# Patient Record
Sex: MALE | Race: BLACK OR AFRICAN AMERICAN | NOT HISPANIC OR LATINO
[De-identification: names, ages, dates, MRNs, and addresses within clinical notes are randomized per-mention and may not be internally consistent; named-entity substitution may affect disease eponyms.]

---

## 2017-06-16 PROBLEM — K85.20 ALCOHOL-INDUCED ACUTE PANCREATITIS: Status: ACTIVE | Noted: 2017-06-16

## 2017-06-16 PROBLEM — R03.0 ELEVATED BLOOD PRESSURE READING: Status: ACTIVE | Noted: 2017-06-16

## 2017-06-16 PROBLEM — K85.20 ALCOHOL-INDUCED ACUTE PANCREATITIS, UNSPECIFIED COMPLICATION STATUS: Status: ACTIVE | Noted: 2017-06-16

## 2017-06-16 NOTE — ED PROVIDER NOTES
[facility] in: [facility] In [place]    History   Patient presents with:  Abdomen/Flank Pain (GI/)    Stated Complaint: LUQ pain/nausea x 2 days. pain radiates to chest and back.  was seen at [facility]*    HPI    CHIEF COMPLAINT: Na pain  Skin: no rashes  Neurological: no headache     Otherwise a complete review of systems was obtained and other than the HPI was negative     The patient's medication list, past medical history and social history elements is as listed in today's nurse's kg  BMI 21.29 kg/m2  SpO2 96%        Physical Exam    Vital signs and nursing notes reviewed  General Appearance: Patient is alert and oriented x4 in no acute distress  Eyes: pupils equal and round, reactive to light.  No pallor or injection, no sclera icte DIFFERENTIAL WITH PLATELET    Narrative: The following orders were created for panel order CBC WITH DIFFERENTIAL WITH PLATELET.   Procedure                               Abnormality         Status                     ---------

## 2017-06-16 NOTE — H&P
[facility] HOSPITALIST  History and Physical     Rosie Wayne [facility] Patient Status:  Emergency    1980 MRN [id number]   Location [address] [place] [address] Attending Dipti Avery MD   Hosp Day # 0 PCP No primary care provider 21.29 kg/m2  SpO2 98%  General: No acute distress. Alert and oriented x 3. Mild tremors  HEENT: Normocephalic atraumatic. Moist mucous membranes. EOM-I. PERRLA. Anicteric. Neck: No lymphadenopathy. No JVD. No carotid bruits.   Respiratory: Clear to auscult

## 2017-06-16 NOTE — ED NOTES
Report given to Robby Dailey [address] [facility]. Wife will drive patient by private car, instructed to go directly to [facility], states understanding.

## 2017-06-16 NOTE — ED PROVIDER NOTES
Patient Seen in: [facility] In [place]    History   Patient presents with:  Abdomen/Flank Pain (GI/)    Stated Complaint: LUQ pain/nausea x 2 days. pain radiates to chest and back.  was seen at [facility]*    HPI        Past Medical Hi Neutrophil Absolute Prelim 7.10 (*)     Neutrophil Absolute 7.10 (*)     Monocyte Absolute 0.88 (*)     All other components within normal limits   CBC WITH DIFFERENTIAL WITH PLATELET    Narrative:      The following orders were created for panel order CBC today.  The patient was provided with opiate analgesics for pain in the emergency department. Suspect a gastritis or pancreatitis. Will consider CT imaging based on laboratory reports. Otherwise agree with the treatment plan.

## 2017-06-16 NOTE — ED INITIAL ASSESSMENT (HPI)
C/o LUQ pain and nausea since yesterday-pain radiates to chest and back started today. Was seen at a [de-identified[facility] Immediate Care , labs were drawn and chest xray-taken.

## 2017-06-17 NOTE — PLAN OF CARE
NURSING ADMISSION NOTE      Patient admitted via Wheelchair. Wife drove him from [place] [facility]. Oriented to room. Safety precautions initiated. Bed in low position. Call light in reach. Admission interview completed. Report given to floor RN.

## 2017-06-17 NOTE — PROGRESS NOTES
[facility] HOSPITALIST  Progress Note     [facility] Patient Status:  Inpatient    1980 MRN [id number]   [facility] 3NE-A Attending Emanuel Rodriguez MD   Hosp Day # 1 PCP None Pcp     Chief Complaint: abd pain    S: Patient with kiah abuse/dependence with risk for withdrawal-[facility] protocol, vitamins; showing mild signs of withdrawal now   I believe patient is under reporting  3. Thrombocytopenia due to etoh use  4. transaminitis-Probable mild alcoholic hepatitis  5.  Tobacco use/dependen

## 2017-06-18 NOTE — PLAN OF CARE
ANXIETY    • Will report anxiety at manageable levels Progressing        CARDIOVASCULAR - ADULT    • Maintains optimal cardiac output and hemodynamic stability Progressing    • Absence of cardiac arrhythmias or at baseline Progressing        GASTROINTESTIN

## 2017-06-18 NOTE — DISCHARGE SUMMARY
[facility] HOSPITAL[facility]  DISCHARGE SUMMARY     Cipriano [facility] Patient Status:  Inpatient    1980 MRN [id number]   [facility] 3NE-A Attending Micheal Alarcon MD   [facility] Day # 2 PCP None Pcp     Date of Admission: 2017  Date of [address] Incidental or significant findings and recommendations (brief descriptions):  • See above    Lab/Test results pending at Discharge:   · none    Consultants:  • none    Antibiotic: none   End date: none    Discharge Medication List:     Discharge Me rhonchi. Cardiovascular: S1, S2. Regular rate and rhythm. No murmurs, rubs or gallops. Abdomen: Soft, nontender, nondistended. Positive bowel sounds. No rebound or guarding. Neurologic: No focal neurological deficits.    Musculoskeletal: Moves all extr

## 2017-06-18 NOTE — PROGRESS NOTES
NURSING DISCHARGE NOTE    Discharged Home via Ambulatory. Accompanied by Spouse  Belongings Taken by patient/family.

## 2017-06-18 NOTE — PROGRESS NOTES
[facility] HOSPITALIST  Progress Note     [facility] Patient Status:  Inpatient    1980 MRN [id number]   [facility] 3NE-A Attending Dewey Camacho MD   [facility] Day # 2 PCP None Pcp     Chief Complaint: abd pain    S: Patient doing we pancreatitis-fluids, cld, US with evidence of gall stone but no blockages  2. Alcohol abuse/dependence with risk for withdrawal- patient states he will follow up with resources given to him by his PCP  3. Thrombocytopenia due to etoh use  4.  transaminitis-

## 2017-06-19 NOTE — PROGRESS NOTES
[facility] [address]  [place], [address]  ?  06/19/2017  ? Re: Robert Montano  ? To Whom It May Concern:    Robert Montano was admitted to [facility] from 6/16/2017 to 06/18/2017.     Please excuse Wolfgang Prieto

## 2017-06-19 NOTE — PAYOR COMM NOTE
ADMISSION REVIEW     Payor: [address]  Subscriber #:  [id number]  Authorization Number: N/A    Admit date: 6/16/2017  2:30 PM       Admitting Physician: Sebastian Burrows MD    REVIEW DOCUMENTATION:  Patient Seen in: [facility] Emergency Departme None (Room air)       Current:/96 mmHg  Pulse 104  Temp(Src) 98.6 °F (37 °C)  Resp 21  Ht 172.7 cm (5' 8\")  Wt 63.504 kg  BMI 21.29 kg/m2  SpO2 96%    Physical Exam  Gastrointestinal:  abdomen is soft; mild tenderness to palpation of right and left dilatation. Sonographer states negative sonographic Nogueira's sign. 2. Pancreas and portions of the abdominal aorta are obscured limiting evaluation.     Multiple doses IV pain meds needed for pain control

## 2017-09-10 NOTE — BH LEVEL OF CARE ASSESSMENT
Level of Care Assessment Note                General Questions  Why are you here?: \"I hadn't eaten for awile so I told my wife to bring me in because I wasn't feeling well. \"    Precipitating Events: Pt reported headaches that cause nausea and they cause Steady  Speech Characteristics: Normal rate;Normal rhythm;Normal volume  Concentration: Unimpaired  Memory: Recent memory intact (Did not assess remote memory)  Orientation Level: Oriented X4  Thought Characteristics: Alert; Coherent;Guarded;Logical;Organiz

## 2017-09-10 NOTE — ED INITIAL ASSESSMENT (HPI)
Pt presents to ER with \"not feeling well. \" pt has not been eating and only drinking alcohol. Pt has a hx of pancreatitis and liver cirrosis. No vomiting, no diarrhea. Last drink was this morning.  Pt states that he is interested in detox and wants to stop

## 2017-09-10 NOTE — ED PROVIDER NOTES
Patient Seen in: [facility] Emergency Department    History   Patient presents with:  Abdomen/Flank Pain (GI/)  Poor Feed Anorexia (constitutional)    Stated Complaint: poor intake, abd pain    HPI    Patient is a 26-year-old male who is here requ 165/115  Pulse: 104  Resp: 18  Temp: 98 °F (36.7 °C)  Temp src: Temporal  SpO2: 99 %  O2 Device: None (Room air)    Current:/98   Pulse 112   Temp 98 °F (36.7 °C) (Temporal)   Resp 18   Ht 172.7 cm (5' 8\")   Wt 59 kg   SpO2 100%   BMI 19.77 kg/m² therapeutic range has been validated against 0.3-0.7 heparin anti-Xa units/mL. MAGNESIUM - Normal   CBC WITH DIFFERENTIAL WITH PLATELET    Narrative: The following orders were created for panel order CBC WITH DIFFERENTIAL WITH PLATELET.   Procedure

## 2017-11-30 ENCOUNTER — CHARTING TRANS (OUTPATIENT)
Dept: OTHER | Age: 37
End: 2017-11-30

## 2017-11-30 NOTE — ED NOTES
Pt belongings placed smartsafe bag [id number], belonging include pants, shirt, shoes, boxers, phone, keys and wallet. Belongings placed at nurses station bin.

## 2017-11-30 NOTE — ED NOTES
Pt's wife called nursing station and inquired Omer Beard is going on with pt\". When asking pt's permission to give wife information pt does not want nurse telling wife anything and that wife should call pt's cell.   Wife notified and will call pt's cell phon

## 2017-11-30 NOTE — ED NOTES
Pt c/o pain at IV site. Fluids are completed. He is asking to take IV site out as it is hurting him. No redness, no swelling noted. IV site Dc'd. Pt is asking for clothes. Instructed pt again why clothes can't be given.   He is asking why he can't cora

## 2017-11-30 NOTE — ED NOTES
Per Dr. May Dalton, pt may use his cell phone to call for ride and he may leave when ride comes to drive him home. Pt unable to get a hold of wife yet. Pt asking for belongings before his wife gets here.   Pt instructed that belongings would be given when

## 2017-11-30 NOTE — ED PROVIDER NOTES
Patient Seen in: [facility] Emergency Department    History   Patient presents with:  Chest Pain Angina (cardiovascular)    Stated Complaint: chest pain    HPI    Patient presents to the emergency department after drinking alcohol this morning he fe discharge. Left eye exhibits no discharge. Scleral icterus is present. Neck: Normal range of motion. Neck supple. No JVD present. Cardiovascular: Regular rhythm, normal heart sounds and intact distal pulses. Exam reveals no gallop and no friction rub. Abnormal            Final result                 Please view results for these tests on the individual orders.    RAINBOW DRAW BLUE   RAINBOW DRAW LAVENDER   RAINBOW DRAW LIGHT GREEN   RAINBOW DRAW GOLD   XR CHEST AP PORTABLE  (CPT=71010)   Final Result diagnosis)  Palpitations    Disposition:  Discharge  11/30/2017  5:00 pm    Follow-up:  Miller Hennessy  [address] [facility]  [phone]    Schedule an appointment as soon as possible for a visit          Medications Pr

## 2017-11-30 NOTE — ED INITIAL ASSESSMENT (HPI)
Pt was at home today and felt chest pain with feelings of heart racing. H/o etoh abuse and appears grossly intoxicated. Denies sob. Some nausea. Pt is slow to respond verbally.

## 2017-11-30 NOTE — ED NOTES
Nurse spent much time with pt as he has lots of questions regarding labs, vital signs,. Pt ambulated to bathroom. Gait is steady. Pt speaks slowly when he talks, pausing between short phrases. No aggressive behavior.   Dr. Deepa Winkler notified of elevated

## 2017-12-01 ENCOUNTER — CHARTING TRANS (OUTPATIENT)
Dept: OTHER | Age: 37
End: 2017-12-01

## 2017-12-01 ENCOUNTER — LAB SERVICES (OUTPATIENT)
Dept: OTHER | Age: 37
End: 2017-12-01

## 2017-12-01 NOTE — ED NOTES
Pt states his wife will not come and get him. He has no other person to call to come and get him. Pt is alert and oriented. He is steady on his feet and conversing normally with staff.   Dr. Brenna Anthony notified and will speak to pt regarding discharge optio

## 2017-12-01 NOTE — ED NOTES
Pt's car keys with security and may  at 2300 tonight or after. Pt aware. Escorted to cab by security. Pt asked if he could get something out of his car with security. Security states pt would not be able to do this.   Assisted to cab and discharg

## 2017-12-01 NOTE — ED NOTES
Call placed to [employer] at [phone].   Cab will be approx 20 minutes to arrival.  Pt's car keys will be left with security

## 2017-12-01 NOTE — ED NOTES
Pt informed by Dr. Tang Casas he could take a taxi home but would have to leave car with Security. Pt agreed to this. Pt car key placed in [facility] [id number] and given to security.

## 2017-12-04 ENCOUNTER — CHARTING TRANS (OUTPATIENT)
Dept: OTHER | Age: 37
End: 2017-12-04

## 2017-12-04 LAB
ALBUMIN SERPL-MCNC: 4.7 G/DL (ref 3.6–5.1)
ALBUMIN/GLOB SERPL: 1.6 (ref 1–2.4)
ALP SERPL-CCNC: 86 UNITS/L (ref 45–117)
ALT SERPL-CCNC: 108 UNITS/L
ANION GAP SERPL CALC-SCNC: 18 MMOL/L (ref 10–20)
AST SERPL-CCNC: 162 UNITS/L
BASOPHILS # BLD: 0 K/MCL (ref 0–0.3)
BASOPHILS NFR BLD: 1 %
BILIRUB SERPL-MCNC: 1.1 MG/DL (ref 0.2–1)
BUN SERPL-MCNC: 3 MG/DL (ref 6–20)
BUN/CREAT SERPL: 6 (ref 7–25)
CALCIUM SERPL-MCNC: 9.3 MG/DL (ref 8.4–10.2)
CHLORIDE SERPL-SCNC: 107 MMOL/L (ref 98–107)
CHOLEST SERPL-MCNC: 224 MG/DL
CHOLEST/HDLC SERPL: 2.2
CO2 SERPL-SCNC: 22 MMOL/L (ref 21–32)
CREAT SERPL-MCNC: 0.52 MG/DL (ref 0.67–1.17)
DIFFERENTIAL METHOD BLD: ABNORMAL
EOSINOPHIL # BLD: 0 K/MCL (ref 0.1–0.5)
EOSINOPHIL NFR BLD: 0 %
ERYTHROCYTE [DISTWIDTH] IN BLOOD: 13.7 % (ref 11–15)
GLOBULIN SER-MCNC: 2.9 G/DL (ref 2–4)
GLUCOSE SERPL-MCNC: 67 MG/DL (ref 65–99)
HDLC SERPL-MCNC: 100 MG/DL
HEMATOCRIT: 47.2 % (ref 39–51)
HEMOGLOBIN: 15.8 G/DL (ref 13–17)
LDLC SERPL CALC-MCNC: 96 MG/DL
LENGTH OF FAST TIME PATIENT: 12 HRS
LENGTH OF FAST TIME PATIENT: 12 HRS
LYMPHOCYTES # BLD: 1.2 K/MCL (ref 1–4.8)
LYMPHOCYTES NFR BLD: 22 %
MEAN CORPUSCULAR HEMOGLOBIN: 33.5 PG (ref 26–34)
MEAN CORPUSCULAR HGB CONC: 33.5 G/DL (ref 32–36.5)
MEAN CORPUSCULAR VOLUME: 100 FL (ref 78–100)
MONOCYTES # BLD: 0.4 K/MCL (ref 0.3–0.9)
MONOCYTES NFR BLD: 7 %
NEUTROPHILS # BLD: 3.7 K/MCL (ref 1.8–7.7)
NEUTROPHILS NFR BLD: 70 %
NONHDLC SERPL-MCNC: 124 MG/DL
PLATELET COUNT: 97 K/MCL (ref 140–450)
POTASSIUM SERPL-SCNC: 3.8 MMOL/L (ref 3.4–5.1)
RED CELL COUNT: 4.72 MIL/MCL (ref 4.5–5.9)
SODIUM SERPL-SCNC: 143 MMOL/L (ref 135–145)
TOTAL PROTEIN: 7.6 G/DL (ref 6.4–8.2)
TRIGL SERPL-MCNC: 138 MG/DL
TSH SERPL-ACNC: 1.6 MCUNITS/ML (ref 0.35–5)
WHITE BLOOD COUNT: 5.3 K/MCL (ref 4.2–11)

## 2017-12-07 ENCOUNTER — CHARTING TRANS (OUTPATIENT)
Dept: OTHER | Age: 37
End: 2017-12-07

## 2018-01-18 ENCOUNTER — CHARTING TRANS (OUTPATIENT)
Dept: OTHER | Age: 38
End: 2018-01-18

## 2018-01-18 ENCOUNTER — LAB SERVICES (OUTPATIENT)
Dept: OTHER | Age: 38
End: 2018-01-18

## 2018-01-19 ENCOUNTER — CHARTING TRANS (OUTPATIENT)
Dept: OTHER | Age: 38
End: 2018-01-19

## 2018-01-19 LAB
ALBUMIN SERPL-MCNC: 4.2 G/DL (ref 3.6–5.1)
ALBUMIN/GLOB SERPL: 1.4 (ref 1–2.4)
ALP SERPL-CCNC: 70 UNITS/L (ref 45–117)
ALT SERPL-CCNC: 20 UNITS/L
ANION GAP SERPL CALC-SCNC: 15 MMOL/L (ref 10–20)
AST SERPL-CCNC: 21 UNITS/L
BILIRUB SERPL-MCNC: 0.4 MG/DL (ref 0.2–1)
BUN SERPL-MCNC: 8 MG/DL (ref 6–20)
BUN/CREAT SERPL: 12 (ref 7–25)
CALCIUM SERPL-MCNC: 9.2 MG/DL (ref 8.4–10.2)
CHLORIDE SERPL-SCNC: 107 MMOL/L (ref 98–107)
CO2 SERPL-SCNC: 26 MMOL/L (ref 21–32)
CREAT SERPL-MCNC: 0.68 MG/DL (ref 0.67–1.17)
ERYTHROCYTE [DISTWIDTH] IN BLOOD: 13 % (ref 11–15)
GLOBULIN SER-MCNC: 3 G/DL (ref 2–4)
GLUCOSE SERPL-MCNC: 63 MG/DL (ref 65–99)
HEMATOCRIT: 46.9 % (ref 39–51)
HEMOGLOBIN: 15.8 G/DL (ref 13–17)
LENGTH OF FAST TIME PATIENT: 0 HRS
MEAN CORPUSCULAR HEMOGLOBIN: 32.6 PG (ref 26–34)
MEAN CORPUSCULAR HGB CONC: 33.7 G/DL (ref 32–36.5)
MEAN CORPUSCULAR VOLUME: 96.9 FL (ref 78–100)
PLATELET COUNT: 130 K/MCL (ref 140–450)
POTASSIUM SERPL-SCNC: 4.3 MMOL/L (ref 3.4–5.1)
RED CELL COUNT: 4.84 MIL/MCL (ref 4.5–5.9)
SODIUM SERPL-SCNC: 144 MMOL/L (ref 135–145)
TOTAL PROTEIN: 7.2 G/DL (ref 6.4–8.2)
WHITE BLOOD COUNT: 5.1 K/MCL (ref 4.2–11)

## 2018-01-23 PROBLEM — I10 HYPERTENSION, UNSPECIFIED TYPE: Status: ACTIVE | Noted: 2018-01-23

## 2018-01-23 PROBLEM — D69.6 THROMBOCYTOPENIA (HCC): Status: ACTIVE | Noted: 2018-01-23

## 2018-01-23 PROBLEM — R00.0 TACHYCARDIA: Status: ACTIVE | Noted: 2018-01-23

## 2018-01-23 PROBLEM — F10.939 ALCOHOL WITHDRAWAL SYNDROME WITH COMPLICATION (HCC): Status: ACTIVE | Noted: 2018-01-23

## 2018-01-23 PROBLEM — D72.829 LEUKOCYTOSIS, UNSPECIFIED TYPE: Status: ACTIVE | Noted: 2018-01-23

## 2018-01-23 NOTE — ED NOTES
Pct 3 CTU with report given to and recvd by Dipti Rainey. Prepared for tx to 3604 per cart via transort.

## 2018-01-23 NOTE — ED NOTES
Pt arrives from PED via ambulance brought by [facility] Aldana [facility]. Pt here for continuous care. Pt arrives with ongoing Labetalol drip and 0.9 NS. Pt A/A/O x 3. Pt aware that urine specimen is needed and states no urge to void at this time.  Urinal placed within Pt's

## 2018-01-23 NOTE — ED NOTES
Received bedside report from [facility]. Rounded on pt. Updated on POC. Pt resting comfortably.

## 2018-01-23 NOTE — PLAN OF CARE
NURSING ADMISSION NOTE      Patient admitted via Cart  Oriented to room. Safety precautions initiated. Bed in low position. Call light in reach. Admission navigators completed. Patient alert and oriented x4. On RA, . ST on tele.   HR in

## 2018-01-23 NOTE — ED NOTES
Provider aware of patient increased BP and Hr, Patient resting on cart. Patient reports decrease in pain.

## 2018-01-23 NOTE — PROGRESS NOTES
[facility] HOSPITALIST  Progress Note     [facility] Patient Status:  Inpatient    1980 MRN [id number]   [facility] 3NE-A Attending Byron Tarn MD   Hosp Day # 0 PCP Maine Factor     Chief Complaint: restless     S: [id number] 2. Remains tachycardic   3. Psych liaison to see   4. Low threshold to ICU   2. Acute pancreatitis due to ETOH  1. NPO  2. IVF  3. Pain control   3. Thrombocytopenia due to underlying liver disease /BM suppression    4.  Leukocytosis - ?reactive ; monitor

## 2018-01-23 NOTE — H&P
SW[facility] HOSPITALIST  Progress Note     Jacqui [facility] Patient Status:  Inpatient    1980 MRN [id number]   [facility] 3NE-A Attending Donta Rodriguez MD   Hosp Day # 0 PCP Hermann Bella     Chief Complaint: restless    S: Patient 3. Psych liaison to see   4. Low threshold to ICU   2. Acute pancreatitis due to ETOH  1. NPO  2. IVF  3. Pain control   3. Thrombocytopenia due to underlying liver disease /BM suppression    4. Leukocytosis - ?reactive ; monitor fever, WBC trend.  May ne

## 2018-01-23 NOTE — ED PROVIDER NOTES
On repeated exams while here in the patient's heart rate has come down, his not having any vomiting here, his see was score is under 10 repeatedly, I spoke with hospitalist, and is Dr. Jeo Shankar, and we will downgrade the patient from ICU status to med

## 2018-01-23 NOTE — CONSULTS
[facility] Pulm/CC    -consulted on this pt from ER for CC services bc of ETOH w/drawal and pancreatitis. However, pt was downgraded from ICU and was admitted to 3604 instead. No formal consult placed.   Will follow peripherally, please call if there are question

## 2018-01-23 NOTE — H&P
[facility] HOSPITALIST  History and Physical     Susan Custard [facility] Patient Status:  Emergency    1980 MRN [id number]   Location [address] [facility] Attending Sho Conte, [address] [facility] Day # 0 PCP America Salter     Chief Complaint: total) by mouth daily. Disp: 30 tablet Rfl: 0   multivitamin with minerals Oral Tab Take 1 tablet by mouth daily. Disp: 30 tablet Rfl: 0   Thiamine HCl 100 MG Oral Tab Take 1 tablet (100 mg total) by mouth daily.  Disp: 30 tablet Rfl: 0   diazepam 5 MG Oral mg/dL). No results for input(s): PTP, INR in the last 72 hours. No results for input(s): TROP, CK in the last 72 hours. Imaging: Imaging data reviewed in Epic. ASSESSMENT / PLAN:     1. Acute pancreatitis- Secondary to ETOH.  Will keep NPO, co

## 2018-01-24 PROBLEM — I10 HYPERTENSION: Status: ACTIVE | Noted: 2018-01-23

## 2018-01-24 NOTE — PAYOR COMM NOTE
--------------  ADMISSION REVIEW     Payor: [address] [place] [address]  Subscriber #:  [id number]  Authorization Number: [id number]    Admit date: 1/23/18  Admit time: 0       Admitting Physician: Ema Phillips DO  Attending Physician:  Urvashi Rosen, [01/22/18 1832]  BP: (!) 163/130  Pulse: 122  Resp: 22  Temp: 97.9 °F (36.6 °C)  Temp src: Tympanic  SpO2: 99 %  O2 Device: None (Room air)[BS.2]    Current:[BS.1]BP (!) 170/102   Pulse 112   Temp 97.9 °F (36.6 °C) (Tympanic)   Resp 16   Ht 172.7 cm (5' 8\ -----------         ------                     CBC W/ DIFFERENTIAL[[id number]]          Abnormal            Final result                 Please view results for these tests on the individual orders.    URINALYSIS WITH CULTURE REFLEX[BS.2] specified.[BS.2]      Medications Prescribed:[BS.1]  Current Discharge Medication List           Addendum:  He remained tachycardic with a heart rate of 120 and a blood pressure of 170/110.   Electronic medical record indicates that the patient has had elev 2018  2:20 AM Status:  Signed    :  Ana Cristina Thapa DO (Physician)           [facility] HOSPITALIST  History and Physical     [facility] Patient Status:  Emergency    1980 MRN [id number]   [facility] EMERGE kg)   SpO2 96%   BMI 21.29 kg/m²    General: No acute distress. Alert and oriented x 3. HEENT: Normocephalic atraumatic. Moist mucous membranes. EOM-I. PERRLA. Anicteric. Neck: No lymphadenopathy. No JVD. No carotid bruits.   Respiratory: Clear to auscult IN LAST 1 DAY:  CloNIDine HCl (CATAPRES) tab 0.1 mg     Date Action Dose Route User    1/23/2018 1437 Given 0.1 mg Oral Malik Rivera RN      docusate sodium (COLACE) cap 100 mg     Date Action Dose Route User    1/24/2018 0902 Given 100 mg Oral Tiffanie, A Date/Time Temp Pulse Resp BP SpO2 Weight [facility]   01/24/18 0600 -- 111 -- 111/76 93 % --    01/24/18 0400 99.1 °F (37.3 °C) 126 20 123/88 92 % 147 lb 3.2 oz    01/24/18 0200 -- 124 -- 113/70 93 % --    01/24/18 0000 99.1 °F (37.3 °C) 139 22 128/8

## 2018-01-24 NOTE — PROGRESS NOTES
SWAPNA HOSPITALIST  Progress Note     [facility] Patient Status:  Inpatient    1980 MRN [id number]   [facility] 3NE-A Attending Luly Malloy MD   Hosp Day # 1 PCP [place] Heath     Chief Complaint: ETOH w/d  S:  Fever infusion   Intravenous Q24H   • metoprolol Tartrate  5 mg Intravenous Q6H     ASSESSMENT / PLAN:   1. ETOH withdrawal   1. CIWA protocol   2. Psych liaison following   2. Acute pancreatitis due to ETOH  1. Start CLD  2. Cont IVF  3. Pain control   3.  Fever

## 2018-01-24 NOTE — BH PROGRESS NOTE
Went to see the pt and he said, he was not interested in any cd programs. He said, his pcp recommended a psychiatrist for him to see and that is what he plans on doing when he is d/c. Pt admits to drinking beer and wine. Pts nurse, Uriel Marquez is aware.

## 2018-01-25 NOTE — PROGRESS NOTES
SW[facility] HOSPITALIST  Progress Note     Jacqui [facility] Patient Status:  Inpatient    1980 MRN [id number]   [facility] 3NE-A Attending Rupinder Richmond MD   Hosp Day # 2 PCP Hermann Bella     Chief Complaint: ETOH w/d  S:  Pain Intravenous Q24H   • metoprolol Tartrate  5 mg Intravenous Q6H     ASSESSMENT / PLAN:   1. ETOH withdrawal- improved   1. CIWA protocol   2. Psych liaison following   2. Acute pancreatitis due to ETOH  1. Advance diet  2. Cont IVF  3. Pain control   3.  Kushal Pain

## 2018-01-25 NOTE — PLAN OF CARE
Patient c/o headache and some abdominal pain through the evening. Wanted to take off O2 but de-sats as soon as he takes of O2 especially with pain medication. Patient had an increase in anxiety this AM, requested Ativan.

## 2018-01-25 NOTE — CM/SW NOTE
SW received consult for home health eval, pt does not have a nursing or therapy need at this time and is not homebound. Patient was screened during rounds and no needs are identified at this time. RN to contact ABRAHAN/CM if needs arise.        01/25/18 1300

## 2018-01-26 PROBLEM — D72.829 LEUKOCYTOSIS: Status: ACTIVE | Noted: 2018-01-23

## 2018-01-26 NOTE — PROGRESS NOTES
[facility] Dosing Service  Antibiotic Dosing    Vickie Cronin is a 42year old male for whom pharmacy is dosing Cefepime for treatment of  possible necrotizing pancreatitis . Allergies: has No Known Allergies.     Vitals: /93   Pulse 123

## 2018-01-26 NOTE — PAYOR COMM NOTE
--------------  CONTINUED STAY REVIEW    Payor: [facility]  Subscriber #:  [id number]  Authorization Number: [id number]    Admit date: 1/23/18  Admit time: 0    Admitting Physician: Nuha Torres DO  Attending Physician:  Camila Reynoso AmLODIPine Besylate (NORVASC) tab 10 mg, 10 mg, Oral, Daily  •  hydrALAzine HCl (APRESOLINE) injection 10 mg, 10 mg, Intravenous, Q4H PRN  •  lactated ringers infusion, , Intravenous, Continuous  •  benzocaine-menthol (CEPACOL (SUGAR-FREE)) 1 lozenge, 1 lo abnormal sleep patterns, increased activity, polydipsia and polyphagia  ENMT:  Negative for ear drainage, hearing loss and nasal drainage  Eyes:  Negative for eye discharge and vision loss  Gastrointestinal:  Positive for abdominal pain, negative for const NEPRELIM  12.68*  11.90*  9.98*   WBC  15.2*  13.9*  12.6   PLT  80.0*  81.0*  92.0*              Recent Labs   Lab  01/24/18   0605  01/26/18   0542   GLU  116*  113*   BUN  10  4*   CREATSERUM  0.64*  0.48*   CA  7.4*  7.9*   ALB   --   2.2*   NA  141       ABDOMEN/PELVIS:  LIVER:  There is mild fatty infiltration of the liver.  Ascites is seen adjacent to the liver.   BILIARY:  No visible dilatation or calcification.    PANCREAS:  Edematous changes are seen involving the pancreas with notable surroundin Jenny Amador MD on 1/25/2018 at 22:31       Approved by: Cesar Scherer MD          PROCEDURE:  US ABDOMEN COMPLETE (CPT=76700)     COMPARISON:  US [facility] ABDOMEN COMPLETE (CPT=76700), 6/17/2017, 7:44.     INDICATIONS:  abd pain     TECHNIQUE:  Real time g normalized. No acute surgical issue identified. 2. CT and ultrasound of the abdomen are both negative for gall stones - low suspicion for concurrent cholelithiasis contributing to his pancreatitis.   3. Discussed CT findings and potential ramifications of 1/25/2018 2017 Given 0.5 mg Intravenous Sacih De Dios RN    1/25/2018 1835 Given 0.5 mg Intravenous Lauryn Goyal RN      HYDROmorphone HCl PF (DILAUDID) 1 MG/ML injection 0.8 mg     Date Action Dose Route User    1/25/2018 5350 Given 0.8 mg Tiffanie Sood

## 2018-01-26 NOTE — CONSULTS
[facility]  Report of Consultation    Junior [facility] Patient Status:  Inpatient    1980 MRN [id number]   [facility] 3NE-A Attending Malena Diego MD   Hosp Day # 3 PCP Lito Segovia     Reason for Consultation:  Acute n Mother    • Schizophrenia Brother      possible diagnosis   • Diabetes Maternal Grandmother    • Diabetes Maternal Grandfather    • Diabetes Paternal Grandmother    • Diabetes Paternal Grandfather       reports that he has been smoking Cigarettes.   He has mg, Rectal, Daily PRN  •  FLEET ENEMA (FLEET) 7-19 GM/118ML enema 133 mL, 1 enema, Rectal, Once PRN  •  HYDROmorphone HCl PF (DILAUDID) 1 MG/ML injection 0.2 mg, 0.2 mg, Intravenous, Q2H PRN **OR** HYDROmorphone HCl PF (DILAUDID) 1 MG/ML injection 0.4 mg, EOM are intact. Neck: No tenderness to palpitation. Full range of motion to flexion and extension, lateral rotation and lateral flexion of cervical spine. No JVD. Supple. Lungs: No respiratory distress. No wheezes, no rales, no rhonchi.  No seconda ACR (American College of Radiology) NRDR ([address]) which includes the Dose Index Registry. PATIENT STATED HISTORY:(As transcribed by Technologist)  Patient c/o RUQ and LUQ abdominal pain radiating to sternum.  Pain increases aft No visible mass. Pelvic organs appropriate for patient age. BONES:  No bony lesion or fracture.       =====  CONCLUSION:    1. No CT evidence of central pulmonary embolus.   2. There is extensive edematous changes and surrounding inflammatory changes in =====  CONCLUSION:  Limited evaluation of pancreas due to partial obscuration by bowel gas. Otherwise unremarkable abdominal ultrasound.            Dictated by: Tom Stinson MD on 1/22/2018 at 20:12       Approved by: Tom Stinson MD         [facility] Kristian Torres  1/26/2018  10:47 AM    I agree with the note as scribed by the PA  I performed my own physical exam and obtained the history as detailed above.   I have made all appropriate changes in documentation as necessary  I attest to the

## 2018-01-26 NOTE — PROGRESS NOTES
[facility]  Progress Note     Alex Postin [facility] Patient Status:  Inpatient    1980 MRN [id number]   [facility] 3NE-A Attending Schuyler Estrada MD   Hosp Day # 3 PCP Cata Spine     Chief Complaint: ETOH w/d  S:  Patie Followed by   • potassium chloride  20 mEq Intravenous Once   • AmLODIPine Besylate  10 mg Oral Daily   • metRONIDAZOLE  500 mg Intravenous Q8H   • cefepime  1 g Intravenous Q8H   • docusate sodium  100 mg Oral BID   • multivitamin/thiamine/folic acid infu

## 2018-01-26 NOTE — CONSULTS
[facility]                       Gastroenterology [address] [facility] Gastroenterology    Cipriano Irving Patient Status:  Inpatient    1980 MRN [id number]   [facility] 3NE-A Attending Melisa Cerda MD   [facility] Day (RESTORIL) cap 15 mg 15 mg Oral Nightly PRN   [COMPLETED] furosemide (LASIX) injection 60 mg 60 mg Intravenous Once   [COMPLETED] iohexol (OMNIPAQUE) 350 MG/ML injection 100 mL 100 mL Intravenous ONCE PRN   lactated ringers infusion  Intravenous Continuous (FOLVITE) 1 mg in dextrose 5 % 1,000 mL infusion  Intravenous Q24H   [] 0.9%  NaCl infusion  Intravenous Continuous   [] HYDROmorphone HCl PF (DILAUDID) 1 MG/ML injection 0.5 mg 0.5 mg Intravenous Q30 Min PRN   [COMPLETED] sodium chloride 0.9 arthritis, myalgias, arthralgias            Genitourinary:  The patient reports no history of recurrent urinary tract infections, hematuria, dysuria, or nephrolithiasis           Psychiatric: + EtOH abuse            Oncologic: The patient reports no histor 24.0  30.0     Recent Labs   Lab  01/26/18   0542   RBC  3.89*   HGB  12.5*   HCT  35.6*   MCV  91.5   MCH  32.1   MCHC  35.1   RDW  12.4   NEPRELIM  9.98*   WBC  12.6   PLT  92.0*       Recent Labs   Lab  01/26/18   0542   ALT  9*   AST  24       Imaging: pancreas with notable surrounding inflammatory changes.  Decreased enhancement is seen involving the pancreatic head/uncinate process as well as pancreatic tail.  A pseudocyst or loculated fluid   collection to suggest abscess formation is not seen.   SPLEE obtained.     COMPARISON:  [facility] , XR CHEST AP PORTABLE  (CPT=71045), 1/24/2018, 14:45.     INDICATIONS:  elevated HR     PATIENT STATED HISTORY: (As transcribed by Technologist)  Elevated heart rate.  Patient offered no additional history at this time. ______________________________________________________  Bebe Goddard    PROCEDURE:  US ABDOMEN COMPLETE (CPT=76700)     COMPARISON:  [facility] US ABDOMEN COMPLETE (CPT=76700), 6/17/2017, 7:44.     INDICATIONS:  abd pain     TECHNIQUE:  Real time gray-scale ultrasou Continue aggressive IV fluids   2. OK to continue clear liquid diet encouraging smaller more frequent meals   3. Scheduled for US guided paracentesis today, will add fluid for cell count/cx   4. Encourage activity as tolerated; IS hourly while awake  5.  Kirit Rojo

## 2018-01-26 NOTE — CONSULTS
Pharmacy Note: Route Optimization for banana bag     Patient is currently on banana bag iv. The patient meets the criteria to convert to the oral equivalent as established by the IV to Oral conversion protocol approved by the P&T committee.    Medication

## 2018-01-27 NOTE — PROGRESS NOTES
Gastroenterology Progress Note  S: Feels better today. Having BM. No SOB.   O: /84 (BP Location: Left arm)   Pulse 100   Temp 98.4 °F (36.9 °C) (Oral)   Resp 18   Ht 68\"   Wt 138 lb 8 oz   SpO2 95%   BMI 21.06 kg/m²   Gen: AAOx2  CV: RRR No murmurs

## 2018-01-27 NOTE — PROGRESS NOTES
[facility]  Progress Note    [facility] Patient Status:  Inpatient    1980 MRN [id number]   [facility] 3NE-A Attending Heri Robb MD   Hosp Day # 4 PCP CELENA HODGES     Subjective:  No new complaints, incisional pancreatitis, unspecified complication status     Elevated blood pressure reading     Drug-seeking behavior     Insomnia     Tachycardia     Thrombocytopenia (HCC)     Alcohol withdrawal syndrome with complication (HCC)     Leukocytosis     Hypertension

## 2018-01-27 NOTE — PROGRESS NOTES
[facility]  Progress Note     Bryant Espinal [facility] Patient Status:  Inpatient    1980 MRN [id number]   [facility] 3NE-A Attending Emma Jameson MD   Hosp Day # 4 PCP Dejuan Ramirez     Chief Complaint: ETOH w/d  S:  Still 1 tablet Oral Daily   • Thiamine HCl  100 mg Oral Daily   • folic acid  1 mg Oral Daily   • AmLODIPine Besylate  10 mg Oral Daily   • metRONIDAZOLE  500 mg Intravenous Q8H   • cefepime  1 g Intravenous Q8H   • docusate sodium  100 mg Oral BID     ASSESSME

## 2018-01-28 NOTE — PROGRESS NOTES
[facility]  Progress Note    Cipriano [facility] Patient Status:  Inpatient    1980 MRN [id number]   [facility] 3NE-A Attending Melisa Cerda MD   Hosp Day # 5 PCP CELENA HODGES     Subjective:  No new complaints, No signifi diet  3. This patient will be discharged today  4. Repeat CT scan abdomen in 10 days  5. See me in 14 days. 6. Patient at risk for pseudocyst of the pancreas    My total face time with this patient was 30 minutes.   Greater than half of our visit was spent

## 2018-01-28 NOTE — PROGRESS NOTES
[facility]  Progress Note     [facility] Patient Status:  Inpatient    1980 MRN [id number]   [facility] 3NE-A Attending Omid Dickson MD   Hosp Day # 5 PCP Masoud Espinosa     Chief Complaint: ETOH w/d  S: doing cefTRIAXone  1 g Intravenous Q24H   • multivitamin  1 tablet Oral Daily   • Thiamine HCl  100 mg Oral Daily   • folic acid  1 mg Oral Daily   • AmLODIPine Besylate  10 mg Oral Daily   • docusate sodium  100 mg Oral BID     ASSESSMENT / PLAN:   1.  ETOH with

## 2018-01-28 NOTE — PROGRESS NOTES
1/28/2018    Patient Name: Osmin Vásquez [facility]      To Whom It May Concern: This letter has been written at the patient's request. The above patient was seen at [facility] for treatment of a medical condition from 1/22/2018-1/28/2018.   Please excu

## 2018-01-28 NOTE — PLAN OF CARE
NURSING DISCHARGE NOTE    Discharged Home via Wheelchair. Accompanied by Family member and Support staff  Belongings Taken by patient/family. IV removed, discharge instructions given to patient, all pertinent questions and concerns addressed.

## 2018-01-28 NOTE — PLAN OF CARE
CARDIOVASCULAR - ADULT    • Maintains optimal cardiac output and hemodynamic stability Progressing    • Absence of cardiac arrhythmias or at baseline Progressing        GASTROINTESTINAL - ADULT    • Minimal or absence of nausea and vomiting Progressing is hopeful to begin a ETOH treatment program at discharge and has a psychiatrist lined up;  Patient will continue to have education related to plan of care, medication purpose/side effects reinforced, and be prepared for discharge and transition to the nex

## 2018-01-29 NOTE — PAYOR COMM NOTE
--------------  DISCHARGE REVIEW    Payor: [address] [facility]  Subscriber #:  [id number]  Authorization Number: [id number]    Admit date: 1/23/18  Admit time:  3847  Discharge Date: 1/28/2018  3:02 PM     Admitting Physician: Gmea Friend Elevated blood pressure reading     Insomnia     Tachycardia     Thrombocytopenia (HCC)     Leukocytosis     Hypertension     Hypoxia     Fever     Pneumonia of both lower lobes due to infectious organism     Acute necrotizing pancreatitis     Pancreatitis 139   K  2.8*  3.1*  2.7*   CL  104   --   103   CO2  30.0   --   30.0   ALKPHO  53   --   54   AST  24   --   16   ALT  9*   --   10*   BILT  1.0   --   0.8   TP  6.0*   --   6.0*      Recent Labs   Lab  01/26/18   0956   PTP  12.8   INR  1.00      No re

## 2018-01-29 NOTE — DISCHARGE SUMMARY
[facility]  DISCHARGE SUMMARY     [facility] Patient Status:  Inpatient    1980 MRN [id number]   [facility] 3NE-A Attending No att. providers found   Hosp Day # 5 PCP Lokesh Chase     Date of Admission: 2018 focal weakness no hematochezia, melena, hematuria, hemoptysis, or hematemesis    Brief Synopsis: Patient was initially admitted for alcohol withdrawal and acute pancreatitis. The patient's pancreatitis was suspected secondary to the alcoholism.   Patient w 1-2 tablets by mouth every 4 (four) hours as needed for Pain.    Quantity:  30 tablet  Refills:  0        CONTINUE taking these medications      Instructions Prescription details   ALPRAZolam 0.5 MG Tabs  Commonly known as:  XANAX      Take 0.5 mg by mouth -----------------------------------------------------------------------------------------------  PATIENT DISCHARGE INSTRUCTIONS: See electronic chart    Rah Guaman MD 1/29/2018    Time spent:  > 30 minutes

## 2018-02-03 NOTE — TELEPHONE ENCOUNTER
Attempted to reach patient regarding CT results  Left message to call back regarding his symptoms  Patient has follow up with Dr Villa Finley 2/6

## 2018-02-06 PROBLEM — K86.89 PANCREATIC NECROSIS: Status: ACTIVE | Noted: 2018-02-06

## 2018-02-06 PROBLEM — K85.20 ALCOHOL-INDUCED ACUTE PANCREATITIS: Status: RESOLVED | Noted: 2017-06-16 | Resolved: 2018-02-06

## 2018-02-07 NOTE — H&P
New Patient Visit Note       Active Problems      1. Pancreatic necrosis    2.  Alcohol-induced acute pancreatitis, unspecified complication status        Chief Complaint   Patient presents with:  Abdominal Pain: NEW PT - ABDOMINAL PAIN, CT RESULTS, SEEN IN level, no obvious cyst wall development, and no liquefaction that is obvious in that site. Also in the head of the pancreas he has a greater than 2.5 cm region of ischemia and necrosis. Again no fluid, no air, and no cyst wall formation.   He remains with Years of education:                 Number of children:               Social History Main Topics    Smoking status: Current Every Day Smoker                                                     Packs/day: 1.00      Years: 15.00 Negative for apnea, cough, shortness of breath and wheezing. Cardiovascular: Negative for chest pain, palpitations and leg swelling.    Gastrointestinal: Negative for abdominal distention, abdominal pain, anal bleeding, blood in stool, constipation, diar in follow-up care for evaluation of several sites within the pancreas revealing ischemic disease. He had no evidence of pseudocyst, abscess, or ascites at the time of discharge. The patient has been tolerating an oral diet.   He is not eating well, ac There are no masses. There is no evidence of ascites. The patient has a negative Nogueira sign. Bowel sounds have normal activity normal pitch. The liver is within normal limits, spleen is not palpable.   There are no inguinal or umbilical hernias present

## 2018-02-07 NOTE — PATIENT INSTRUCTIONS
This patient presents at this time for further care treatment regarding an episode of very severe pancreatitis. It is alcohol induced pancreatitis. It resulted in a prolonged hospital stay.   The patient had evidence of pancreatic ischemia on multiple CT There are other areas that are of concern for potential necrosis. There are some lymph nodes around the pancreas and in the periaortic region consistent with a chronic inflammatory process.   The patient remains with a slightly thickened gallbladder wall, patient is in the process of scheduling the CT scan at [facility].  He will require pretreatment with the standard regimen of steroid and Benadryl. Chu Snider

## 2018-02-08 NOTE — TELEPHONE ENCOUNTER
Voice mail left for patient that pre authorization for CT has been obtained from insurance and he can schedule at this time.

## 2018-10-10 PROBLEM — R11.2 NAUSEA AND VOMITING, INTRACTABILITY OF VOMITING NOT SPECIFIED, UNSPECIFIED VOMITING TYPE: Status: ACTIVE | Noted: 2018-10-10

## 2018-10-10 PROBLEM — D72.829 LEUKOCYTOSIS, UNSPECIFIED TYPE: Status: ACTIVE | Noted: 2018-10-10

## 2018-10-10 PROBLEM — E86.0 DEHYDRATION: Status: ACTIVE | Noted: 2018-10-10

## 2018-10-10 PROBLEM — E08.10 DIABETIC KETOACIDOSIS WITHOUT COMA ASSOCIATED WITH DIABETES MELLITUS DUE TO UNDERLYING CONDITION (HCC): Status: ACTIVE | Noted: 2018-10-10

## 2018-10-10 PROBLEM — E87.29 HIGH ANION GAP METABOLIC ACIDOSIS: Status: ACTIVE | Noted: 2018-10-10

## 2018-10-10 PROBLEM — R73.9 HYPERGLYCEMIA: Status: ACTIVE | Noted: 2018-10-10

## 2018-10-10 NOTE — ED NOTES
i-STAT Testing  Site RR  Time 0107  Faisal's test result +  Clinical data 7.14/18.3/115/-23/6.2/97%  RA/ Temp 98.6

## 2018-10-10 NOTE — PROGRESS NOTES
Full consult to follow  34 yr old male adm with new onset DM, now in DKA  Hx ETOH abuse- left [facility] in [place] on Monday  CT with over 5 cm pancreatic pseudocyst  No indication for general surgical intervention at this time but would recom GI consul

## 2018-10-10 NOTE — ED NOTES
0700, ED received call back from Dr. Tianna Telles regarding PT. Updated DrRose On PT room number and reason for page.

## 2018-10-10 NOTE — ED PROVIDER NOTES
Patient Seen in: THE [facility] Emergency Department In [place]    History   Patient presents with:  Nausea/Vomiting/Diarrhea (gastrointestinal)    Stated Complaint: Saira RAMIREZ    This is a 58-year-old male that presents with abdominal pain SpO2 97%   BMI 18.25 kg/m²         Physical Exam    GENERAL: Awake, alert oriented x3, ill-appearing and malnourished. SKIN: Normal, warm, and dry. HEENT:  Pupils equally round and reactive to light. Conjuctiva clear.   Oropharynx is clear and moist.   Mary Abnormal; Notable for the following components:    POC Glucose 382 (*)     All other components within normal limits   POCT GLUCOSE - Abnormal; Notable for the following components:    POC Glucose 348 (*)     All other components within normal limits   POC 134.  Potassium 5.4 slightly hemolyzed. Chloride 97. CO2 6. Anion gap 31. Alcohol was negative. Lactic acid 1.7.  UA showed glucose, moderate blood, rare bacteria. ABG was obtained which showed a pH of 7.1/PCO2 18/PO2 115/bicarb of 7.   Insulin drip w mellitus due to underlying condition [facility])    Disposition:  Admit  10/10/2018  1:52 am    Follow-up:  No follow-up provider specified.       Medications Prescribed:  Current Discharge Medication List        Present on Admission  Date Reviewed: 2/6/2018

## 2018-10-10 NOTE — CONSULTS
[facility]  Endocrinology Consultation    [facility] Patient Status:  Inpatient    1980 MRN [id number]   [facility] 4SW-A Attending Staci Estevez [address] Day # 0 PCP Lacie Mccarthy     Reason for Consultatio mg, 650 mg, Rectal, Q6H PRN  •  glucose (DEX4) oral liquid 15 g, 15 g, Oral, Q15 Min PRN **OR** Glucose-Vitamin C (DEX-4) 4-0.006 g chewable tab 4 tablet, 4 tablet, Oral, Q15 Min PRN **OR** dextrose 50 % injection 50 mL, 50 mL, Intravenous, Q15 Min PRN **O bilaterally  Resp: clear to auscultation bilaterally, non-labored  Abd: soft, nontender, nondistending, +bowel sounds, no palpable ventral hernia  Musculoskeletal: moving bilateral upper and lower extremities spontaneously w/o pain  Neuro: patellar reflexe year old man with history of chronic pancreatitis due to EtOH abuse admitted 10/9/18 with DKA and new onset diabetes  1.  DKA, new onset diabetes  - A1c > 16.4 c/w diabetes  - diabetes may be secondary to chronic pancreatitis vs. VERONICA, will check C-peptide,

## 2018-10-10 NOTE — PLAN OF CARE
Pt remains on insulin gtt with Q 1 accuchecks throughout the day. See results review for blood sugar results and flowsheets for insulin titration. Pt remains in base algorithm column 4. Iv fluids infusing per order. Npo with intermittent zofran for nausea.

## 2018-10-10 NOTE — PLAN OF CARE
Diabetes/Glucose Control    • Glucose maintained within prescribed range Not Progressing          METABOLIC/FLUID AND ELECTROLYTES - ADULT    • Glucose maintained within prescribed range Not Progressing    • Electrolytes maintained within normal limits Not

## 2018-10-10 NOTE — CONSULTS
[facility]  Report of Consultation    [facility] Patient Status:  Inpatient    1980 MRN [id number]   [facility] 4SW-A Attending Delories Seip [address] Day # 0 PCP Bro Ortiz     Reason for Consultation: is normal. LFTs are normal. He is hyponatremic and hyperkalemic. Blood alcohol is normal.  Ketonuria is present. Medical history is otherwise significant for depression and tobacco use. The patient has no surgical history.   He works as a pharmacist. tablet, 4 tablet, Oral, Q15 Min PRN **OR** dextrose 50 % injection 50 mL, 50 mL, Intravenous, Q15 Min PRN **OR** glucose (DEX4) oral liquid 30 g, 30 g, Oral, Q15 Min PRN **OR** Glucose-Vitamin C (DEX-4) 4-0.006 g chewable tab 8 tablet, 8 tablet, Oral, Q15 pulse 109, temperature 99.9 °F (37.7 °C), temperature source Temporal, resp. rate 16, weight 120 lb, SpO2 97 %. General: Alert, orientated x3. Cooperative. No apparent distress. HEENT: Without scleral icterus.   Mucous membranes are moist. EOM are i which includes the Dose Index Registry.      PATIENT STATED HISTORY: (As transcribed by Technologist)  Patient has nausea and vomiting for 3-days         FINDINGS:  Please note the exam is somewhat limited without intravenous or oral contrast.     LIVER:  N is also severely distended. A preliminary report was provided by [facility].            Dictated by: Michael Frey MD on 10/10/2018 at 6:59       Approved by: Michael Frey MD         Impression:  Patient Active Problem List:     Depression     Alcoho

## 2018-10-10 NOTE — PROGRESS NOTES
[facility]  Progress Note     [facility] Patient Status:  Inpatient    1980 MRN [id number]   [facility] 4SW-A Attending Cira Vargas [address] Day # 0 PCP Valentin Mehta     Chief Complaint: nausea    S: P reviewed in Epic. Medications:   • enoxaparin  40 mg Subcutaneous Daily   • famoTIDine  20 mg Intravenous BID   • meropenem  500 mg Intravenous Q8H   • nicotine  1 patch Transdermal Daily       ASSESSMENT / PLAN:     1.  Recurrent acute Pancreatitis and

## 2018-10-10 NOTE — CONSULTS
[facility]  Report of Consultation    [facility] Patient Status:  Inpatient    1980 MRN [id number]   [facility] 4SW-A Attending Bella Crowder [address] Day # 0 PCP Reymundo Rivera     Date of consultation: RASH    Medications:    Current Facility-Administered Medications:   •  Enoxaparin Sodium (LOVENOX) 40 MG/0.4ML injection 40 mg, 40 mg, Subcutaneous, Daily  •  ondansetron HCl (ZOFRAN) injection 4 mg, 4 mg, Intravenous, Q6H PRN  •  Metoclopramide HCl (REGL polyphagia  HEENT:  Negative for hearing changes,  Nasal discharge  Eyes:  Negative for eye discharge, visual disturbance   Cardiovascular:  Negative for cool extremity and irregular heartbeat/palpitations  Respiratory:  Negative for cough, dyspnea and whe 8.4  8.1*   ALB  4.0   --    NA  134*  136   K  5.4*  4.9   CL  97*  110   CO2  6.0*  12.0*   ALKPHO  78   --    AST  24   --    ALT  20   --    BILT  0.6   --    TP  9.4*   --          Assessment/Plan:     42-year-old male with known history of EtOH abuse

## 2018-10-10 NOTE — CONSULTS
[facility]  Report of Consultation    Junior Elizabeth [facility] Patient Status:  Inpatient    1980 MRN [id number]   [facility] 4SW-A Attending Miguel [facility] [address] Day # 0 PCP Lito Segovia     Reason for Consultation: pertinent surgical history.   Family History   Problem Relation Age of Onset   • Hypertension Father    • Diabetes Mother    • Schizophrenia Brother         possible diagnosis   • Diabetes Maternal Grandmother    • Diabetes Maternal Grandfather    • Diabete anorexia, fevers, malaise, night sweats. He has had weight loss, fatigue and chills. Eyes: Negative for visual disturbance, irritation and redness.   Ears, nose, mouth, throat, and face: Negative for hearing loss, tinnitus, nasal congestion, snoring, sore noted.   Neck: Soft, supple neck; trachea midline, no adenopathy, no thyromegaly. Lungs: Clear to auscultation and percussion   Chest wall: No tenderness or deformity. Heart: Regular rate and rhythm, sinus tachycardia, normal S1S2, no murmur.    Abdomen alcohol abuse  History of depression    Plan: Continue fluid resuscitation and insulin therapy. Continue to follow serial venous blood gases and electrolytes. Surgical consultation pending.   Culture results awaited with interest.    Pasha Key SR

## 2018-10-10 NOTE — PROGRESS NOTES
ICU  Critical Care APRN H & P    NAME: Arlene Carbajal [facility] - ROOM: 833/132-Z - MRN: [id number] - Age: 34year old - :1980    History Of Present Illness:  Belle Shah is a 34year old male with PMHx significant for longstanding alcohol abus Reports 15-20 pound unintentional weight loss over past 3 months. PMH:  Past Medical History:   Diagnosis Date   • Alcohol abuse    • Depression    • Pancreatitis      History reviewed. No pertinent surgical history.     Social Hx:  Social History 10/10/2018    .0 10/10/2018    CREATSERUM 1.42 10/10/2018    BUN 15 10/10/2018     10/10/2018    K 5.4 10/10/2018    CL 97 10/10/2018    CO2 6.0 10/10/2018     10/10/2018    CA 8.4 10/10/2018    ALB 4.0 10/10/2018    ALKPHO 78 10/10/201 discussions with the patient, family, and clinical staff.

## 2018-10-10 NOTE — CONSULTS
Gastroenterology Initial Consultation    [facility] Patient Status:  Inpatient    1980 MRN [id number]   [facility] 4SW-A Attending Roel Ibrahim [address] Day # 0 PCP Early [place]       Reason for Consultatio famoTIDine (PEPCID) injection 20 mg, 20 mg, Intravenous, BID  •  Meropenem (MERREM) 500 mg in sodium chloride 0.9% 100 mL MBP, 500 mg, Intravenous, Q8H  •  influenza vaccine split quad (FLULAVAL) ages 6 months to 65 years inj 0.5ml, 0.5 mL, Intramuscular, rate/palpitations. Respiratory: Denies shortness of breath, chronic/frequent hoarseness, wheezing, chronic cough, spitting up blood, cough up sputum.   Genitourinary: Denies painful/difficult urination, frequent urinary infections, frequent urination, bloo 10/10/2018    CREATSERUM 0.78 10/10/2018    BUN 8 10/10/2018     10/10/2018    K 4.0 10/10/2018     10/10/2018    CO2 14.0 10/10/2018     10/10/2018    CA 7.9 10/10/2018    ALB 4.0 10/10/2018    ALKPHO 78 10/10/2018    BILT 0.6 10/10/201 endoscopic cystgastrostomy if the patient becomes amenable. Jesusita Courtney D.O.    Gastroenterology and [facility] Gastroenterology, [facility].

## 2018-10-11 NOTE — CONSULTS
[facility] Urology   Consultation Note    Liseth Bellamy [facility] Patient Status:  Inpatient    1980 MRN [id number]   [facility] 4SW-A Attending Diana [facility] Day # 1 PCP Valentin Mehta     Reason for [facility] diagnosis   • Diabetes Maternal Grandmother    • Diabetes Maternal Grandfather    • Diabetes Paternal Grandmother    • Diabetes Paternal Grandfather       reports that he has been smoking cigarettes. He has a 15.00 pack-year smoking history.  He uses smoke Continuous  •  0.45% NaCl infusion, , Intravenous, Continuous  •  nicotine (NICODERM CQ) 21 MG/24HR 1 patch, 1 patch, Transdermal, Daily  •  morphINE sulfate (PF) 4 MG/ML injection 2 mg, 2 mg, Intravenous, Q2H PRN    Review of Systems:  Pertinent items are involving the tail of the pancreas measuring up to 5.8 x 3.5 x 3.0 cm concerning for necrotizing   pancreatitis.   Additional focal hypo attenuation lesion in the head of the pancreas measuring 10 x 7 mm and hypo enhancement involving the uncinate process m limited without intravenous or oral contrast.     LIVER:  No enlargement, atrophy, abnormal density, or significant focal lesion. BILIARY:  No visible dilatation or calcification. PANCREAS:  Fluid collection centered on the tail of the pancreas.   Rojas April state     Clinical depression     Alcohol abuse     Alcohol-induced acute pancreatitis, unspecified complication status     Elevated blood pressure reading     Drug-seeking behavior     Insomnia     Tachycardia     Thrombocytopenia (HCC)     Alcohol withdr

## 2018-10-11 NOTE — PROGRESS NOTES
Gastroenterology Follow-Up Note      Job [facility] Patient Status:  Inpatient    1980 MRN [id number]   [facility] 4SW-A Attending Josh De Jesus Golisano [facility] Day # 1 PCP Rolando Irizarry     Chief Complaint/Reason for patient is amenable    DKA  -improved  -per icu, endo    Acute on Chronic Pancreatitis  -cnt supportive care  -diet per surgery    Will sign off at this time. Please call if I can be of further assistance.        Rumaldo Favre  Gastroenterology/Advanced Endos

## 2018-10-11 NOTE — PLAN OF CARE
1930: Rec'd report from previous RN, care assumed, pt assessed per flow sheets. Pt resting in bed, wife at bedside. Pt appears in no acute distress, vitals stable. Pt appears depressed, flat affect, slow to respond to questions. Pt is AOx4.   Pt remains

## 2018-10-11 NOTE — PROGRESS NOTES
[facility]  Progress Note    Edin Madison [facility] Patient Status:  Inpatient    1980 MRN [id number]   [facility] 4SW-A Attending Michael [address] Day # 1 PCP CELENA HODGES     Subjective:  Brenda Lee i Depression     Alcohol abuse, continuous     Anxiety state     Clinical depression     Alcohol abuse     Alcohol-induced acute pancreatitis, unspecified complication status     Elevated blood pressure reading     Drug-seeking behavior     Insomnia     Tach

## 2018-10-11 NOTE — PROGRESS NOTES
[facility]  Endocrinology Progress Note    Vida [facility] Patient Status:  Inpatient    1980 MRN [id number]   [facility]SW-A [facility] Day # 1 PCP Maine Mei     CC: Patient presents PLT 96.0 10/11/2018    CREATSERUM 0.50 10/11/2018    BUN 3 10/11/2018     10/11/2018    K 2.8 10/11/2018     10/11/2018    CO2 20.0 10/11/2018     10/11/2018    CA 7.6 10/11/2018    ALB 2.3 10/11/2018    ALKPHO 59 10/11/2018    BILT 0.4 in [place] as outpt as pt lives in [place]     2. Acute recurrent pancreatitis  - management per primary service and GI and surgery      D/w nursing    Dr. Darrion Friend will be taking over the endocrine service at 5p today.   Please feel free to contact us wi

## 2018-10-11 NOTE — PAYOR COMM NOTE
--------------  ADMISSION REVIEW     Payor: [address]  Subscriber #:  [id number]  Authorization Number: N/A    Admit date: 10/10/18  Admit time: 1839       Admitting Physician: Eitan Green MD  Attending Physician:  Michael May malnourished. SKIN: Normal, warm, and dry. HEENT:  Pupils equally round and reactive to light. Conjuctiva clear. Oropharynx is clear and moist.   Lungs: Clear to auscultation bilaterally with no rales, no retractions, and no wheezing.   HEART:  Regular r limits   POCT GLUCOSE - Abnormal; Notable for the following components:    POC Glucose 348 (*)     All other components within normal limits   POCT GLUCOSE - Abnormal; Notable for the following components:    POC Glucose 241 (*)     All other components wi communicated to patient and family. Repeat Accu-Chek 348 after insulin drip was initiated. Patient will be admitted to ICU for further management. Discussed case with [facility] hospitalist  Dr. Britney Goodman and [facility] pulmonary Dr. Sloane Isaac.   Attempted to contact DEDRICK any blurred vision, frequency, does admit to weight loss and excessive thirst however.      Family History:   Family History   Problem Relation Age of Onset   • Hypertension Father    • Diabetes Mother    • Diabetes Maternal Grandmother    • Diabetes Matern hours.    Imaging: Imaging data reviewed in Epic. ASSESSMENT / PLAN:     1. Nausea and vomiting  1. Multifactorial due to pancreatitis, AGMA  2. Pancreatitis   1. IVF  2. Anti Emetics  3. Pain control  4. CT with ? Necrosis  3.  Anion gap metabolic aci Assessment/Plan:        Abdominal Pain, Nausea/Vomiting  -The majority of his symptoms are likely due to his DKA as well as element of recurrent acute pancreatitis.  He would also likely benefit from endoscopic cystgastrostomy given his large pancreatic resolves  - informed pt that he will require basal-bolus insulin upon discharge and CDE to see   - pt to follow-up with endocrinology Dr. Dion Mooney or Dr. Javan Phipps in [place] as outpt     2.  Acute recurrent pancreatitis  - management per primary service and GI Rate/Dose Change 3 Units/hr Intravenous Myra Osbaldo, RN    10/10/2018 1708 Rate/Dose Change 6 Units/hr Intravenous Myra Osbaldo, RN    10/10/2018 1500 Rate/Dose Verify 4 Units/hr Intravenous Myra Osbaldo, RN    10/10/2018 1400 Rate/Dose Verify 4 Uni

## 2018-10-11 NOTE — CONSULTS
[facility]  Diabetes Consult Note    Bryant Espinal [facility] Patient Status:  Inpatient    1980 MRN [id number]   [facility] 4SW-A Attending [facility] Day # 1 PCP Dejuan Ramirez     Reason for Consult:     Ne --    --    BUN  15   --   11   --   8   --   7*   --   5*   --   3*   --    --    CREATSERUM  1.42*   --   1.02   --   0.78   --   0.86   --   0.76   --   0.50*   --    --    A1C  >16.4*   --    --    --    --    --    --    --    --    --    --    -- monitored his blood glucose but is confident he can figure it out. Discussed dosing insulin using I:C/CF and he stated he did not have a problem with it. His RN stated she would assist him in delivering insulin once the IV drip is discontinued.       Alth diabetes self-management including nutrition, exercise, blood glucose monitoring, insulin administration, medications, treatment options, follow-up coordination and resources.     CELESTINO Martinez  10/11/2018  3:14 PM

## 2018-10-11 NOTE — PROGRESS NOTES
[facility]  Progress Note     Junior [facility] Patient Status:  Inpatient    1980 MRN [id number]   [facility] 4SW-A Attending Dr. Rufus Dai Day # 1 PCP [facility] [facility]     Chief Complaint: nausea    S: Patient repo [id number]   PTP  12.7   INR  0.95       No results for input(s): TROP, CK in the last 168 hours. Imaging: Imaging data reviewed in Epic.       Medications:   • potassium chloride 40mEq IVPB (peripheral line)  40 mEq Intravenous Once   • sodium phosphate IV

## 2018-10-11 NOTE — PLAN OF CARE
Patient/Family Goals    • Patient/Family Short Term Goal Adequate for Discharge          GASTROINTESTINAL - ADULT    • Maintains or returns to baseline bowel function Not Progressing          Diabetes/Glucose Control    • Glucose maintained within prescrib

## 2018-10-11 NOTE — PROGRESS NOTES
[facility]  Progress Note    [facility] Patient Status:  Inpatient    1980 MRN [id number]   [facility] 4SW-A Attending Claudette [facility] Day # 1 PCP CELENA HODGES     Subjective:  Patient overall feelin Elevated blood pressure reading     Drug-seeking behavior     Insomnia     Tachycardia     Thrombocytopenia (HCC)     Alcohol withdrawal syndrome with complication (HCC)     Leukocytosis     Hypertension     Hypoxia     Fever     Pneumonia of both lower lo within normal limits-hypokalemia-replace per protocol  Management DKA-DM per medical services    Gato Almonte MD

## 2018-10-12 PROBLEM — K86.3 PSEUDOCYST OF PANCREAS: Status: ACTIVE | Noted: 2018-10-12

## 2018-10-12 PROBLEM — E11.9 TYPE 2 DIABETES MELLITUS WITHOUT COMPLICATION, WITH LONG-TERM CURRENT USE OF INSULIN (HCC): Status: ACTIVE | Noted: 2018-10-12

## 2018-10-12 PROBLEM — Z79.4 TYPE 2 DIABETES MELLITUS WITHOUT COMPLICATION, WITH LONG-TERM CURRENT USE OF INSULIN (HCC): Status: ACTIVE | Noted: 2018-10-12

## 2018-10-12 NOTE — PROGRESS NOTES
[facility]  Progress Note     Edin [facility] Patient Status:  Inpatient    1980 MRN [id number]   [facility] 4SW-A Attending Dr. Yessenia Avila # 2 PCP Cristela Sousa     Chief Complaint: hungry    S: Patient repo 16*   --   27   BILT  0.6   --    --   0.4   --   0.5   TP  9.4*   --    --   5.6*   --   5.4*    < > = values in this interval not displayed. Estimated Creatinine Clearance: 193.3 mL/min (A) (based on SCr of 0.38 mg/dL (L)).     Recent Labs   Lab  1 bedside. Patient states he is hungry and thirsty since yesterday. Patient wants to be able to drink some juice. Patient's hypoglycemia this morning. Physical exam:  General: Patient awake and alert. No acute respiratory distress.   Lungs: CTA B  CVS: R

## 2018-10-12 NOTE — PAYOR COMM NOTE
--------------  CONTINUED STAY REVIEW    Payor: [address]  Subscriber #:  [id number]  Authorization Number: [id number]    Admit date: 10/10/18  Admit time: [address]    Admitting Physician: Olimpia Dugan MD  Attending Physician:  Amadou Paulino low  Repeat renal US in 1 month  Recommend an office cystoscopy in light of microhematuria     ENDOCRINOLOGY PROGRESS NOTE    1.  DKA, new onset diabetes  - A1c > 16.4 c/w diabetes  - diabetes may be secondary to chronic pancreatitis vs. VERONICA, C-peptide, ab 50 mL     Date Action Dose Route User    10/12/2018 0550 Given 50 mL Intravenous Karla Lentz RN      dextrose 50 % injection 25 mL     Date Action Dose Route User    10/11/2018 7663 Given 25 mL Intravenous Karla Lentz RN      famoTIDine ( Intravenous Go Schumacher RN      0.45% NaCl infusion     Date Action Dose Route User    10/11/2018 2118 New Bag (none) Intravenous Go Schumacher RN    10/11/2018 1826 Rate/Dose Verify (none) Intravenous Marisol Lynn RN    10/11/2018 1200

## 2018-10-12 NOTE — CONSULTS
[facility] Pharmacy Note:  Discontinuation of Stress Ulcer Prophylaxis     Stevenson Meigs is a 34year old male who has been prescribed stress ulcer prophylaxis with famotidine (PEPCID). The patient no longer meets criteria for stress ulcer prophylaxis.

## 2018-10-12 NOTE — PROGRESS NOTES
[facility]  Progress Note    Maurice Oneal [facility] Patient Status:  Inpatient    1980 MRN [id number]   [facility] 4SW-A Attending [facility] Day # 2 PCP CELENA HODGES     Subjective:  Pt without c/o abdomin --    --   5.6*   --   5.4*    < > = values in this interval not displayed.          Assessment/Plan:   Pt improving- tolerating full liquids  No n/v- no BM yet- positive flatus  Appetite marginal  Discussed pancreatic pseudocyst at bedside with pt and his

## 2018-10-12 NOTE — PROGRESS NOTES
[facility]  Progress Note    [facility] Patient Status:  Inpatient    1980 MRN [id number]   [facility] 4SW-A Attending Diana [facility] Day # 2 PCP Valentin Mehta       Assessment/Plan:34year old man w Range    POC Glucose 132 (H) 65 - 99 mg/dL   POCT GLUCOSE    Collection Time: 10/11/18  9:03 AM   Result Value Ref Range    POC Glucose 126 (H) 65 - 99 mg/dL   POCT GLUCOSE    Collection Time: 10/11/18 10:05 AM   Result Value Ref Range    POC Glucose 136 ( 8.3 - 10.3 mg/dL    Calculated Osmolality 293 275 - 295 mOsm/kg    GFR, Non- 152 >=60    GFR, -American 175 >=60    AST 54 (H) 15 - 41 U/L    Alt 27 17 - 63 U/L    Alkaline Phosphatase 73 45 - 117 U/L    Bilirubin, Total 0.5 0.1 - 2. 29 [facility]

## 2018-10-12 NOTE — PROGRESS NOTES
[facility]  Progress Note    [facility] Patient Status:  Inpatient    1980 MRN [id number]   [facility] 4SW-A Attending [facility] Day # 2 PCP CELENA HODGES     Subjective:  Zan Pelletier i 0425   RBC  5.20  3.48*  3.41*   HGB  16.8  11.0*  11.0*   HCT  48.0  31.3*  30.3*   MCV  92.3  89.9  88.9   MCH  32.3  31.6  32.3   MCHC  35.0  35.1  36.3   RDW  12.8  12.5  13.0   NEPRELIM  17.45*  4.51  2.06   WBC  20.6*  6.7  4.3   PLT  130.0*  96.0*

## 2018-10-12 NOTE — PROGRESS NOTES
[facility]  Urology Progress Note    Jacqui Cassiyd [facility] Patient Status:  Inpatient    1980 MRN [id number]   [facility] 4SW-A Attending Citlaly [id number] [address] [place] Day # 2 PCP Hermann Bella     Subjective:  Jacqui Cassidy

## 2018-10-12 NOTE — PLAN OF CARE
Diabetes/Glucose Control    • Glucose maintained within prescribed range Progressing        GASTROINTESTINAL - ADULT    • Maintains or returns to baseline bowel function Progressing          Assumed care of patient at 0730.  Patient alert and oriented, irri

## 2018-10-12 NOTE — DIETARY NOTE
[facility]    NUTRITION INITIAL ASSESSMENT    Pt does not meet malnutrition criteria.     NUTRITION DIAGNOSIS/PROBLEM:     Impaired nutrient utilization related to uncontrolled diabetes as evidenced by wt loss of 16% (22#) x 8 months    NUTRITION INTER AND EVALUATE/NUTRITION GOALS:    1. PO intake to meet at least 75% patient nutrition prescription  3. No signs of skin breakdown  4.  Maintain lean body mass    MEDICATIONS:  Noted    LABS:  Noted    Pt is at moderate nutrition risk    FOLLOW-UP DATE: 10/15

## 2018-10-12 NOTE — PROGRESS NOTES
[facility] Pharmacy Note: Route Optimization for Acetaminophen (OFIRMEV)    Patient is currently on Acetaminophen (OFIRMEV) 1000 mg IV every 6 hours as needed.    The patient meets the criteria to convert to the oral equivalent as established by the IV to Oral con

## 2018-10-13 NOTE — PROGRESS NOTES
[facility]  Progress Note     Arlene Carbajal [facility] Patient Status:  Inpatient    1980 MRN [id number]   [facility] 5NW-A [facility]ending [facility] Day # 3 PCP Lisa Celis     Chief Complaint: abdominal pain 115*   --   113*  112*   CO2  6.0*   < >  20.0*   --   20.0*  21.0*   ALKPHO  78   --   59   --   73   --    AST  24   --   19   --   54*   --    ALT  20   --   16*   --   27   --    BILT  0.6   --   0.4   --   0.5   --    TP  9.4*   --   5.6*   --   5.4* nursing at [address], DO

## 2018-10-13 NOTE — PROGRESS NOTES
[facility]  Progress Note    Solorio [facility] Patient Status:  Inpatient    1980 MRN [id number]   [facility] 5NW-A Attending [facility] Day # 3 PCP Early [place]     Subjective:  Patient is tolerating --    BILT  0.6   --   0.4   --   0.5   --    TP  9.4*   --   5.6*   --   5.4*   --     < > = values in this interval not displayed.          Assessment/Plan:   Patient with new diagnosis of diabetes-admitted with DKA-now stable  CT scan revealed chronic pa

## 2018-10-13 NOTE — PROGRESS NOTES
[facility]  Progress Note    [facility] Patient Status:  Inpatient    1980 MRN [id number]   [facility] 4SW-A Attending Elliott [address] Day # 3 PCP Lisa Celis       Assessment/Plan:34year old man w Result Value Ref Range    POC Glucose 103 (H) 65 - 99 mg/dL   POCT GLUCOSE    Collection Time: 10/12/18 10:43 AM   Result Value Ref Range    POC Glucose 151 (H) 65 - 99 mg/dL   POCT GLUCOSE    Collection Time: 10/12/18  2:36 PM   Result Value Ref Range

## 2018-10-13 NOTE — PROGRESS NOTES
NURSING DISCHARGE NOTE    Discharged Home via Ambulatory. Accompanied by Spouse  Belongings Taken by patient/family. Blood sugar check and insulin administration education provided. Patient verbalized understanding of procedures.  Patient able to re

## 2018-10-15 NOTE — DISCHARGE SUMMARY
[facility] HOSPITAL[facility]  DISCHARGE SUMMARY     Chiquita [facility] Patient Status:  Inpatient    1980 MRN [id number]   [facility] 5NW-A Attending No att. providers found   Hosp Day # 3 PCP Melina Sanchez     Date of Admission: 10/9/2018 cyst drained however patient refused patient was initially started on IV antibiotics and with patient's progression of the pancreatitis stable pseudocyst formation antibiotics were stopped.   Patient was transferred out of the was converted to subcu insulin meals    Non-Medicare:  Test blood glucose 4-5 times per day before meals, bedtime and for signs, symptoms of hypoglycemia or as ordered by physician   Quantity:  150 strip  Refills:  6     Insulin Aspart Pen 100 UNIT/ML Sopn  Commonly known as:  Kim Salmon REMERON      Take 15 mg by mouth nightly. Refills:  0     multivitamin with minerals Tabs      Take 1 tablet by mouth daily.    Quantity:  30 tablet  Refills:  0     predniSONE 50 MG Tabs  Commonly known as:  DELTASONE      Take one tablet (50mg) by mouth [facility]  [facility] [address] [id number] [phone]  Schedule an appointment as soon as possible for a visit  Patient agrees to schedule for diabetes followup      Vital signs:       Physical Exam:    Ge

## 2018-10-15 NOTE — PAYOR COMM NOTE
--------------  DISCHARGE REVIEW    Payor: [address]  Subscriber #:  [id number]  Authorization Number: [id number]    Admit date: 10/10/18  Admit time:  1621  Discharge Date: 10/13/2018  1:40 PM     Admitting Physician: Raenell Reedy, MD Sameul Spurling 9/5/2018. The pt notes that over past 3 days he has had ongoig nausea, vomiting and barely tolerating liquids. HE describes left sided abdominal pain that radiates to his back.  He has been experiencing chills and has felt warm at home but has not taken his and for signs, symptoms of hypoglycemia or as ordered by physician   Quantity:  150 each  Refills:  6     FREESTYLE SYSTEM Kit      Medicare Only:   If on oral medications, test blood glucose once a day in the morning before breakfast  If on insulin, test Take 1 tablet (5 mg total) by mouth 3 (three) times daily as needed for Anxiety.    Quantity:  10 tablet  Refills:  0     DiphenhydrAMINE HCl 50 MG Tabs  Commonly known as:  BENADRYL      Take one (1) 50mg tablet by mouth one (1) hour before the examination Misc         [place]: [place]    Follow-up appointment:   Janeen Gant  [address] [facility]  [phone]    Schedule an appointment as soon as possible for a visit in 1 week      MD Sammie Guzman

## 2018-11-02 VITALS
TEMPERATURE: 97.1 F | RESPIRATION RATE: 20 BRPM | DIASTOLIC BLOOD PRESSURE: 80 MMHG | HEART RATE: 100 BPM | WEIGHT: 137 LBS | OXYGEN SATURATION: 98 % | BODY MASS INDEX: 20.76 KG/M2 | SYSTOLIC BLOOD PRESSURE: 126 MMHG | HEIGHT: 68 IN

## 2018-11-02 VITALS
HEART RATE: 112 BPM | TEMPERATURE: 98.3 F | HEIGHT: 68 IN | BODY MASS INDEX: 20.61 KG/M2 | SYSTOLIC BLOOD PRESSURE: 164 MMHG | DIASTOLIC BLOOD PRESSURE: 90 MMHG | RESPIRATION RATE: 20 BRPM | OXYGEN SATURATION: 98 % | WEIGHT: 136 LBS

## 2018-11-02 VITALS
OXYGEN SATURATION: 99 % | HEIGHT: 68 IN | TEMPERATURE: 97 F | WEIGHT: 140 LBS | DIASTOLIC BLOOD PRESSURE: 82 MMHG | RESPIRATION RATE: 16 BRPM | SYSTOLIC BLOOD PRESSURE: 140 MMHG | BODY MASS INDEX: 21.22 KG/M2 | HEART RATE: 80 BPM

## 2020-01-01 ENCOUNTER — EXTERNAL RECORD (OUTPATIENT)
Dept: HEALTH INFORMATION MANAGEMENT | Facility: OTHER | Age: 40
End: 2020-01-01

## 2020-11-10 PROBLEM — N17.9 AKI (ACUTE KIDNEY INJURY) (HCC): Status: ACTIVE | Noted: 2020-11-10

## 2020-11-10 PROBLEM — E87.1 HYPONATREMIA: Status: ACTIVE | Noted: 2020-11-10

## 2020-11-10 PROBLEM — E10.10 TYPE 1 DIABETES MELLITUS WITH KETOACIDOSIS WITHOUT COMA (HCC): Status: ACTIVE | Noted: 2020-11-10

## 2020-11-10 NOTE — CONSULTS
[facility]  Report of Consultation    [facility] Patient Status:  Inpatient    1980 MRN [id number]   [facility] 6NE-A Attending Roberto Sheppard MD   Hosp Day # 0 PCP CELENA HODGES       Assessment / Plan:    1) ELDER- Grandfather    • Diabetes Paternal Grandmother    • Diabetes Paternal Marleny Redmond      Denies family history of kidney disease. reports that he has been smoking cigarettes. He has a 15.00 pack-year smoking history.  He uses smokeless tobacco. He reports sodium chloride 0.9% 50 mL IVPB, 100 mg, Intravenous, Once  •  [START ON 11/11/2020] Thiamine HCl (Vitamin B-1) tab 100 mg, 100 mg, Oral, Daily  •  multivitamin with minerals (ADULT) tab 1 tablet, 1 tablet, Oral, Daily  •  folic acid (FOLVITE) tab 1 mg, 1 ALT 50 11/10/2020    LIP 1,322 11/10/2020    MG 3.4 11/10/2020    TROP <0.045 11/10/2020    ETOH <3 11/10/2020    PGLU 382 11/10/2020       Imaging: All imaging studies reviewed.       Thank you for allowing me to participate in the care of your patient

## 2020-11-10 NOTE — ED PROVIDER NOTES
Patient Seen in: [facility] Emergency Department In [place]      History   Patient presents with:  Fatigue    Stated Complaint: weakness    HPI    Insulin-dependent diabetic, alcoholic, last drink 3 or 4 days ago, presents with weakness and increasing thi adenopathy  Lungs: Few crepitations at the bases. Heart: Apical pulse 104 and regular without murmur  Abdomen: Soft and nontender without mass or HSM. No CVA tenderness  Extremities: No peripheral edema or evidence of DVT.   No joint tenderness or swellin Sinus tachycardia with ventricular rate 113 bpm.  Intervals and axes as noted on computer reading. No abnormal ST or T wave changes. Patient is given fluid resuscitation and started on insulin drip.   During his period of observation he did Perri Jimmy

## 2020-11-10 NOTE — CONSULTS
[facility] Note  [facility]  Report of Consultation    [facility] Patient Status:  Inpatient    1980 MRN [id number]   [facility] 6NE-A Attending Ryan Tuttle MD   [facility] RASH    Medications:  • Heparin Sodium (Porcine)  5,000 Units Subcutaneous Q8H [address]     ondansetron HCl, ondansetron HCl, Prochlorperazine Edisylate, glucose **OR** Glucose-Vitamin C **OR** dextrose **OR** glucose **OR** Glucose-Vitamin C, Dextrose-NaCl, in comfortable on RA.  No acute distress  PSYCH: Normal mood and affect, AAOX3  NEURO: CN 2-12 grossly intact, no focal deficits  HEENT: Atraumatic, normocephalic, EOMI, no icterus/hemorrhage, no conjunctival injection/discharge, nares normal  MOUTH: MMM, good atelectasis/consolidation, left greater than right. Recommend follow-up to ensure resolution as clinically indicated. ED M.D. notified of these findings with preliminary radiology report from [facility] services.      Dictated by (CST): Tanya Denney MD on 11

## 2020-11-10 NOTE — PAYOR COMM NOTE
Appropriate for inpatient status per guidelines for weakness and thirst due to DKA with ELDER and possible PNA and pancreatitis.       --------------  ADMISSION REVIEW     Payor: [address] [place] [address] #:  [id number]  Authorization N adenopathy  Lungs: Few crepitations at the bases. Heart: Apical pulse 104 and regular without murmur  Abdomen: Soft and nontender without mass or HSM. No CVA tenderness  Extremities: No peripheral edema or evidence of DVT.   No joint tenderness or swellin Sinus tachycardia with ventricular rate 113 bpm.  Intervals and axes as noted on computer reading. No abnormal ST or T wave changes. Patient is given fluid resuscitation and started on insulin drip.   During his period of observation he did maintain sta HPI.     Physical Exam:    /78   Pulse 112   Temp 97.6 °F (36.4 °C) (Temporal)   Resp 18   Ht 5' 8.11\" (1.73 m)   Wt 140 lb (63.5 kg)   SpO2 97%   BMI 21.22 kg/m²   General: No acute distress. Alert and oriented x 3.   HEENT: Normocephalic atraumatic Abuse  1. Last drink 4-5 days ago, no withdrawal sx  2. Psych liason eval  3. Advise cessation  8. Nicotine dependence/1ppd smoker  1. Advice cessation  2.  Nicotine patch               11/10 PULM      ASSESSMENT     · HHS likely precipitated by alcohol abu (LL) 134 (L)        Ref.  Range 11/10/2020 04:10 11/10/2020 09:31   Carbon Dioxide, Total Latest Ref Range: 21.0 - 32.0 mmol/L 8.0 (LL) 17.0 (L)   BUN Latest Ref Range: 7 - 18 mg/dL 51 (H) 43 (H)   CREATININE Latest Ref Range: 0.70 - 1.30 mg/dL 4.27 (H) 2.7 BLOOD GAS HCO3 Latest Ref Range: 22.0 - 26.0 mEq/L 6.2 (L)     ISTAT BLOOD GAS O2 SATURATION Latest Ref Range: 92 - 100 % 97     ISTAT BLOOD GAS BASE DEFICIT Latest Units: mmol/L -23.0     ISTAT SAMPLE TYPE Unknown Arterial     ISTAT PATIENT TEMPERATURE La

## 2020-11-10 NOTE — H&P
[facility]  History and Physical     [facility] Patient Status:  Emergency    1980 MRN [id number]   Location [address] [place] [address] Attending Leopold Crooks, MD   Hosp Day # 0 [facility] [address] [facility] facility-administered medications on file prior to encounter.      •  Glucose Blood (ONETOUCH ULTRA BLUE) In Vitro Strip, TEST 4-5 TIMES PER DAY, Disp: 150 strip, Rfl: 0    •  Insulin Pen Needle (BD PEN NEEDLE STANLEY U/F) 32G X 4 MM Does not apply Misc, USE A daily., Disp: 30 tablet, Rfl: 0    •  Thiamine HCl 100 MG Oral Tab, Take 1 tablet (100 mg total) by mouth daily. , Disp: 30 tablet, Rfl: 0    •  TraMADol HCl 50 MG Oral Tab, Take 1-2 tablets ( mg total) by mouth every 8 (eight) hours as needed for [employer] DM  1. Insulin gtt, IVF  2. Close monitoring of electrolytes  3. NPO  4. Endo Consult  2. Left pleural effusion  1. Empiric PNA tx, Levofloxacin   3. Hyponatremia/Pseudohyponatremia  1. Corrected Na ~129  2. Hypovolemic, IVF  4.  Acute Kidney Injury, pre-re

## 2020-11-10 NOTE — DIETARY NOTE
[facility]    NUTRITION ASSESSMENT    Pt does not meet malnutrition criteria.     NUTRITION DIAGNOSIS/PROBLEM:    Predicted suboptimal energy intake related to medical therapy predicted to decrease ability to consume sufficient intake as evidenced by N discretion    MONITOR AND EVALUATE/NUTRITION GOALS:    3. No signs of skin breakdown  4.  Maintain lean body mass  PO within 72h    MEDICATIONS:  FA, MVI, Thiamine, Insulin    LABS:  A1C >16.4; ; Glu 293; -539    Pt is at high nutrition risk

## 2020-11-10 NOTE — CONSULTS
ENDOCRINOLOGY CONSULTATION    Attending physician:  Natalie Travis MD  Consulting physican:  Mary Anne Vergara MD    Admission Date:  11/10/2020  Consultation Date:  11/10/2020      Reason for Consultation: Mgmt of Type 1 DM and DKA    Chief Complai meals. Pre-meal blood sugars:    •  Naltrexone (VIVITROL) 380 MG Intramuscular Recon Susp, Take by mouth nightly.  , Disp: , Rfl:     •  mirtazapine 15 MG Oral Tab, Take 15 mg by mouth nightly., Disp: , Rfl:     •  predniSONE 50 MG Oral Tab, Take one table Subcutaneous, Q8H [address]    •  ondansetron HCl (ZOFRAN) injection 4 mg, 4 mg, Intravenous, Q6H PRN    •  Prochlorperazine Edisylate (COMPAZINE) injection 5 mg, 5 mg, Intravenous, Q8H PRN    •  Insulin Regular Human (NOVOLIN R) 100 Units in sodium chloride 0.9 children: Not on file      Years of education: Not on file      Highest education level: Not on file    Tobacco Use      Smoking status: Current Every Day Smoker        Packs/day: 1.00        Years: 15.00        Pack years: 15        Types: Cigarettes 98 - 112 mmol/L 104 104 103 99   Carbon Dioxide, Total      21.0 - 32.0 mmol/L 24.0 22.0 20.0 (L) 17.0 (L)   ANION GAP      0 - 18 mmol/L 10 11 13 18   BUN      7 - 18 mg/dL 31 (H) 33 (H) 38 (H) 43 (H)   CREATININE      0.70 - 1.30 mg/dL 1.67 (H) 1.91 ( following      We will follow with you. Thank you for the consultation.       Elieser Vanegas MD  Endocrinology, Diabetes and Metabolism  [facility]

## 2020-11-10 NOTE — PLAN OF CARE
Assumed care of pt this am from [facility], he is AOx3, drowsy and slow to respond. Insulin drip infusing, 0.9NS changed to D5.45 when BS was under 250. Pt more alert this evening.

## 2020-11-11 NOTE — PLAN OF CARE
Assumed care of the pt at approx. 1945 pm. Awakens easily to voice, oriented x4 with delayed responses. Following commands appropriately. CIWA 0-2. C/o generalized weakness, headache & back pain.  Morphine 1 mg IV administered PRN, sleeping upon reassessmen

## 2020-11-11 NOTE — PROGRESS NOTES
ENDOCRINOLOGY PROGRESS NOTE    Typed by Veronica Rousseau MD on 11/11/2020      S:  Sleeping but arousable. Tolerating diet.        O:  /84   Pulse 87   Temp 98.5 °F (36.9 °C)   Resp 19   Ht 68.11\"   Wt 123 lb 14.4 oz (56.2 kg)   SpO2 99%   BM PHOSPHATASE      45 - 117 U/L 75       Component      Latest Ref Rng & Units 11/11/2020   WBC      4.0 - 11.0 x10(3) uL 7.1   RBC      4.30 - 5.70 x10(6)uL 3.65 (L)   Hemoglobin      13.0 - 17.5 g/dL 11.6 (L)   Hematocrit      39.0 - 53.0 % 31.8 (L)   Plat

## 2020-11-11 NOTE — PROGRESS NOTES
[facility]  Progress Note     [facility] Patient Status:  Inpatient    1980 MRN [id number]   [facility] 6NE-A Attending Iza Turpin MD   Hosp Day # 1 PCP Valentin Mehta     Chief Complaint: DKA   S: Feels bett <0.045        Imaging: Imaging data reviewed in Epic.   Medications:   • potassium phosphate IVPB  30 mmol Intravenous Once   • levofloxacin  750 mg Intravenous Q24H   • Heparin Sodium (Porcine)  5,000 Units Subcutaneous Q8H [facility]   • nicotine  1 patch Transd

## 2020-11-11 NOTE — PLAN OF CARE
SCr improved to 1.19 mg/dL; estimated CrCl 64.3 mL/min. Levaquin adjusted to 750 mg IV q24 hours per protocol.     Sobeida Prasad, PharmD  11/11/20 7:45 AM

## 2020-11-11 NOTE — PLAN OF CARE
Problem: Impaired Activities of Daily Living  Goal: Achieve highest/safest level of independence in self care  Description: Interventions:  - Assess ability and encourage patient to participate in ADLs to maximize function  - Promote sitting position [facility]

## 2020-11-11 NOTE — PROGRESS NOTES
[facility] Pulmonary/Critical Care Progress Note     SUBJECTIVE/Interval history: All events, procedures, notes reviewed.  No acute events overnight, he feels better today, nausea/abdominal pain has resolved this morning 1. 19   GFRAA 57* 71 87  87   GFRNAA 50* 61 75  75   CA 11.0* 10.3* 9.6  9.6    139 139  139   K 3.2* 3.2* 3.3*  3.3*  3.3*    107 107  107   CO2 24.0 24.0 23.0  23.0     Recent Labs   Lab 11/10/20  0410 11/11/20  0521   RBC 4.60 3.65*   HGB 14 Heparin Sodium (Porcine)  5,000 Units Subcutaneous Q8H [facility]   • nicotine  1 patch Transdermal Daily   • Thiamine HCl  100 mg Oral Daily   • multivitamin with minerals  1 tablet Oral Daily   • folic acid  1 mg Oral Daily   • [START ON 11/12/2020] levofloxaci

## 2020-11-11 NOTE — OCCUPATIONAL THERAPY NOTE
OCCUPATIONAL THERAPY QUICK EVALUATION - INPATIENT    Room Number: 1565/6768-C  Evaluation Date: 11/11/2020     Type of Evaluation: Quick Eval  Presenting Problem: ketoacidosis, L pleural effusion, ETOH    Physician Order: IP Consult to Occupational Therapy DAILY LIVING ASSESSMENT  AM-PAC ‘6-Clicks’ Inpatient Daily Activity Short Form   How much help from another person does the patient currently need…  -   Putting on and taking off regular lower body clothing?: None   -   Bathing (including washing, rinsing, problem-focused assessments, limited treatment options    Overall Complexity  LOW     OT Discharge Recommendations: Home       PLAN   Patient has been evaluated and presents with no skilled Occupational Therapy needs at this time.   Patient discharged from

## 2020-11-11 NOTE — PROGRESS NOTES
[facility]  Nephrology Progress Note    Venita Irving Attending:  Herve Bush MD       Assessment and Plan:    1) ELDER- due to dehydration in setting of repeat emesis / minimal PO intake + ETOH abuse / DKA- improving rapidly with fluid resus KINDER FALL RISK       RISK  Present to ED b/c of fall (syncope, seizure, or ALOC) no  Age  >70 no  Altered Mental Status (Intoxicated with alcohol or substance confusion, inability to follow instructions, disorientation) no  Impaired Mobility (Ambulates or transfers with assistive devices or assist. Ambulates with unsteady gait and no assistance.  Unable to ambulate to transfer.) no  Nursing Judgement (Bowel or bladder incontinence, diarrhea, urinary frequency or urgency, leg weakness, orthostatic hypotension, dizziness or vertigo, narcotic use.) no    Interventions in place in red.   YES TO ANY RISK = HIGH FALL RISK     1. Move patient closer to nurses stations  2. Familiarize the patient with environment  3. Place call light within reach and demonstrate call light use  4. Keep patients personal possessions within patient safe reach (if appropriate)   5. Place stretcher in low position and brakes locked  6.Place yellow socks and armband on patient   7. Place green triangle on patients door  8. Give patient urinal if applicable  9. Keep floor surfaces clean and dry  10.Keep patient care areas uncluttered  11. Use a lap belt or posey vest   12. Assess patient hourly for :Pain, persona needs, position change, and call light access.          11/11/2020     11/11/2020     11/11/2020    K 3.3 11/11/2020    K 3.3 11/11/2020    K 3.3 11/11/2020     11/11/2020     11/11/2020    CO2 23.0 11/11/2020    CO2 23.0 11/11/2020     11/11/2020     11/11/2020    CA 9.6 1 LORazepam (ATIVAN) injection 1 mg, 1 mg, Intravenous, Q1H PRN    Or    •  LORazepam (ATIVAN) tab 2 mg, 2 mg, Oral, Q1H PRN    Or    •  LORazepam (ATIVAN) injection 2 mg, 2 mg, Intravenous, Q1H PRN    •  Thiamine HCl (Vitamin B-1) tab 100 mg, 100 mg, Oral,

## 2020-11-11 NOTE — PAYOR COMM NOTE
--------------  ADMISSION REVIEW     Payor: [address] [facility] #:  [id number]  Authorization Number: [id number]    Admit date: 11/10/20  Admit time: 7815       Admitting Physician: Jahaira Mares MD  Attending Physician:  Sara Pierson All other systems reviewed and negative except as noted above.     Physical Exam     ED Triage Vitals [11/10/20 0345]   /73   Pulse 118   Resp 18   Temp 97.6 °F (36.4 °C)   Temp src Temporal   SpO2 96 %   O2 Device None (Room air)       Current: Neutrophil Absolute Prelim 11.23 (*)     Neutrophil Absolute 11.23 (*)     Lymphocyte Absolute 0.62 (*)     Monocyte Absolute 1.36 (*)     All other components within normal limits   TROPONIN I - Normal   LACTIC ACID, PLASMA - Normal   ETHYL ALCOHOL - Nor There is no disposition on file for this visit. There is no disposition time on file for this visit. Follow-up:  No follow-up provider specified.         Medications Prescribed:  Current Discharge Medication List                                       Si Social History:  reports that he has been smoking cigarettes. He has a 15.00 pack-year smoking history. He uses smokeless tobacco. He reports previous alcohol use. He reports that he does not use drugs.     Family History:   Family History   Problem Relat •  AmLODIPine Besylate 10 MG Oral Tab, Take 1 tablet (10 mg total) by mouth daily. , Disp: 30 tablet, Rfl: 0    •  HYDROcodone-acetaminophen 5-325 MG Oral Tab, Take 1-2 tablets by mouth every 4 (four) hours as needed for Pain., Disp: 30 tablet, Rfl: 0    • Psychiatric: Appropriate mood and affect.       Diagnostic Data:      Labs:  Recent Labs   Lab 11/10/20  0410   WBC 13.4*   HGB 14.7   MCV 92.0   PLT 90.0*       Recent Labs   Lab 11/10/20  0410   *   BUN 51*   CREATSERUM 4.27*   GFRAA 18*   GFRNAA 1 Consult Orders   1.  ED Consult to Pulmonary/Critical Care [[id number]] ordered by Redd Vergara MD at 11/10/20 [facility]  [facility]  Report of Consultation           Yamilka Hancock reports that he has been smoking cigarettes. He has a 15.00 pack-year smoking history. He uses smokeless tobacco. He reports current alcohol use of about 8.0 standard drinks of alcohol per week.  He reports that he does not use drugs.     Allergies:     Srinivasa 11/10/20 0450 107/70 — — 113 18 97 % — —   11/10/20 0345 108/73 97.6 °F (36.4 °C) Temporal 118 18 96 % 5' 8.11\" (1.73 m) 140 lb (63.5 kg)         Intake/Output:  No intake or output data in the 24 hours ending 11/10/20 0939           PHYSICAL EXAM  GEN: A BILUR Moderate*   KETUR >=160*   BLOODURINE Moderate*   PHURINE 5.0   PROUR 100 mg/dL*   UROBILINOGEN 0.2   NITRITE Negative   LEUUR Negative   WBCUR 1-4   RBCUR 0-2   BACUR Rare*   EPIUR Small        Radiology:      Xr Chest Ap Portable  (cpt=56505)     R Filed: 11/10/2020 10:00 AM Date of Service: 11/10/2020  9:54 AM Status: Signed   : Delilah Moses MD (Physician)     [facility]  Report of Consultation           [facility] Patient Status:  Inpatient    1980 MRN [id number]   Lo • Hypertension Father     • Diabetes Mother     • Schizophrenia Brother           possible diagnosis   • Diabetes Maternal Grandmother     • Diabetes Maternal Grandfather     • Diabetes Paternal Grandmother     • Diabetes Paternal Grandfather        Denies •  LORazepam (ATIVAN) tab 1 mg, 1 mg, Oral, Q1H PRN **OR** LORazepam (ATIVAN) injection 1 mg, 1 mg, Intravenous, Q1H PRN **OR** LORazepam (ATIVAN) tab 2 mg, 2 mg, Oral, Q1H PRN **OR** LORazepam (ATIVAN) injection 2 mg, 2 mg, Intravenous, Q1H PRN  •  Rossyami   CREATSERUM 4.27 11/10/2020     BUN 51 11/10/2020      11/10/2020     K 4.7 11/10/2020     CL 72 11/10/2020     CO2 8.0 11/10/2020      11/10/2020     CA 8.8 11/10/2020     ALB 4.1 11/10/2020     ALKPHO 126 11/10/2020     BILT 1.0 11/10/2020 Their current outpatient regimen is [place] 23 Units daily and Fiasp 8-10 Units with meals.  He has missed his insulin doses.     Currently, they are on IV insulin per DKA protocol     The last HgA1C was 11.1% from 11/10/2020.           REVIEW OF SYSTEMS    •  AmLODIPine Besylate 10 MG Oral Tab, Take 1 tablet (10 mg total) by mouth daily. , Disp: 30 tablet, Rfl: 0     •  HYDROcodone-acetaminophen 5-325 MG Oral Tab, Take 1-2 tablets by mouth every 4 (four) hours as needed for Pain., Disp: 30 tablet, Rfl: 0    •  dextrose 50 % injection 50 mL, 50 mL, Intravenous, Q15 Min PRN    Or     •  glucose (DEX4) oral liquid 30 g, 30 g, Oral, Q15 Min PRN    Or     •  Glucose-Vitamin C (DEX-4) chewable tab 8 tablet, 8 tablet, Oral, Q15 Min PRN     •  influenza vaccine split Frequency: 4 or more times a week        Drinks per session: 3 or 4        Binge frequency: Weekly      Drug use: No    Other Topics      Concerns:              FAMILY HISTORY           Family History   Problem Relation Age of Onset   • Hypertension 8.5 - 10.1 mg/dL 11.0 (H) 11.2 (H) 11.7 (H) 11.4 (H)   CALCULATED OSMOLALITY      275 - 295 mOsm/kg 297 (H) 297 (H) 294 300 (H)   eGFR NON-AFR.  AMERICAN      >=60 50 (L) 42 (L) 34 (L) 28 (L)   eGFR       >=60 57 (L) 49 (L) 40 (L) 32 (L) Signed                                [facility] Pulmonary/Critical Care Progress Note      SUBJECTIVE/Interval history: All events, procedures, notes reviewed.  No acute events overnight, he feels better today, nausea/abdominal pain has reso * 180* 168*  168*   BUN 31* 26* 23*  23*   CREATSERUM 1.67* 1.41* 1.19  1.19   GFRAA 57* 71 87  87   GFRNAA 50* 61 75  75   CA 11.0* 10.3* 9.6  9.6    139 139  139   K 3.2* 3.2* 3.3*  3.3*  3.3*    107 107  107   CO2 24.0 24.0 23.0  23.0 CONCLUSION:  1. Bibasilar atelectasis/consolidation, left greater than right. Recommend follow-up to ensure resolution as clinically indicated. ED M.D. notified of these findings with preliminary radiology report from [facility] services.      Dictated by · Dispo: ICU,potential transfer to floor once off of insulin gtt        MD MALCOLM Simon  CCM time: 35minutes.  The patient is critically ill and at high risk for clinical deterioration.                 Delilah Moses MD   Physician   Nephrology   P Extremities: Without clubbing, cyanosis; no edema  Neurologic: Cranial nerves grossly intact, moving all extremities  Skin: Warm and dry, no rashes        Labs:         Lab Results   Component Value Date     WBC 7.1 11/11/2020     HGB 11.6 11/11/2020     H 11/11/2020 1028 Given 6 Units Subcutaneous (Left Lower Arm) Bre Holt RN      insulin detemir (LEVEMIR) 100 UNIT/ML flextouch 20 Units     Date Action Dose Route User    11/11/2020 1028 Given 20 Units Subcutaneous (Left Lower Arm) Bre Holt RN 11/10/2020 1332 Rate/Dose Change 1.5 Units/hr Intravenous Simon MARIMAR Harmon    11/10/2020 1220 Rate/Dose Verify 4 Units/hr Intravenous Steubenelana Harmon RN    11/10/2020 1106 Rate/Dose Change 4 Units/hr Intravenous William Rosenthal RN      levoFLOXaci OTHER:

## 2020-11-11 NOTE — PLAN OF CARE
Received patient at 0730 awake and alert sitting up in chair. No complaints of pain. Insulin drip per protocol. Called Dr. Courtney Reis orders for transition. Voiding. Taking po. Up in hallway with PT/OT.   Problem: METABOLIC/FLUID AND ELECTROLYTES - ADULT Administer ordered medications to maintain glucose within target range  - Assess barriers to adequate nutritional intake and initiate nutrition consult as needed  - Instruct patient on self management of diabetes  Outcome: Progressing

## 2020-11-11 NOTE — PHYSICAL THERAPY NOTE
PHYSICAL THERAPY EVALUATION - INPATIENT     Room Number: 0672/5500-N  Evaluation Date: 11/11/2020  Type of Evaluation: Initial  Physician Order: PT Eval and Treat    Presenting Problem: Type 1 DM ketoacidosis without coma  Reason for Therapy: Mobility Dy within functional limits  · Arousal/Alertness:  appropriate responses to stimuli  · Following Commands:  follows all commands and directions without difficulty  · Initiation: appears intact  · Awareness of Deficits:  poor insight into medical condition--in below. Pt ambulated 400 feet, completing 4 Item Dynamic Gait Index as below. PT had mask, gloves, and glasses donned throughout session. Pt donned mask once sitting at EOB and kept mask on throughout session.      Modified Belle Balance Test=24/28   Duglasu change in speed with significant gait deviations, or changes speed but has significant gait deviations, or changes speed but loses balance but is able to recover and continue walking.  (0)  Severe Impairment: Cannot change speeds, or loses balance and has evaluation the pt present with good strength and functional endurance and balance in order to complete bed mobility, transfers, and ambulation with independence.   Functional outcome measures completed include Modified Belle Balance, 4 Item Dynamic Gait Inde

## 2020-11-11 NOTE — CM/SW NOTE
11/11/20 1300   CM/SW Screening   Referral Source    Information Source Chart review;[facility] staff   Patient lives with Spouse   Patient Status Prior to Admission   Independent with ADLs and Mobility Yes   Discharge Needs   Anticipated D/C needs

## 2020-11-12 NOTE — PROGRESS NOTES
[facility]  Progress Note    [facility] Patient Status:  Inpatient    1980 MRN [id number]   [facility] 3NE-A Attending Janeth Steen MD   Hosp Day # 2 PCP Roel Gates     Subjective:  Cammie Munson is a(n) 1. 19 0.89     Recent Labs   Lab 11/11/20  0521   PTP 12.6   INR 0.92       Cultures: Blood cultures remain negative    Radiology:  No results found.   Chest x-ray is reviewed - ultrasound noted      Medications reviewed     Assessment and Plan:   Patient Ac standpoint with plans for pulmonary follow-up to assure chest x-ray is cleared  · Medications reviewed    Ashley Montana MD  11/12/2020  1:59 PM

## 2020-11-12 NOTE — PROGRESS NOTES
[facility]  Progress Note     Arlene Carbajal [facility] Patient Status:  Inpatient    1980 MRN [id number]   [facility] 6NE-A Attending Yevgeniy Shoemaker MD   Hosp Day # 2 PCP Lisa Celis     Chief Complaint: DKA   S: Feels bett Labs   Lab 11/10/20  0410   TROP <0.045        Imaging: Imaging data reviewed in Epic.   Medications:   • potassium chloride  40 mEq Oral Q4H   • levofloxacin  750 mg Intravenous Q24H   • insulin detemir  20 Units Subcutaneous Daily with breakfast   • Insul

## 2020-11-12 NOTE — PLAN OF CARE
NURSING DISCHARGE NOTE    Discharged Home via Wheelchair. Accompanied by Support staff  Belongings Taken by patient/family. Patient given discharge paperwork and script. Educated on follow ups. Verbalizes understanding.

## 2020-11-12 NOTE — PROGRESS NOTES
[facility]  Nephrology Progress Note    Jeff Barrow [address] Attending:  Janeth Steen MD       Assessment and Plan:    1) ELDER- due to dehydration in setting of repeat emesis / minimal PO intake + ETOH abuse / DKA- resolved.      2) DKA- due to possi  11/12/2020    CA 8.4 11/12/2020    ALB 2.8 11/12/2020    ALKPHO 79 11/12/2020    BILT 0.6 11/12/2020    TP 6.1 11/12/2020    AST 71 11/12/2020    ALT 36 11/12/2020    MG 2.4 11/12/2020    PHOS 1.7 11/12/2020    PGLU 234 11/12/2020       Imaging:  A (ATIVAN) tab 1 mg, 1 mg, Oral, Q1H PRN    Or    •  LORazepam (ATIVAN) injection 1 mg, 1 mg, Intravenous, Q1H PRN    Or    •  LORazepam (ATIVAN) tab 2 mg, 2 mg, Oral, Q1H PRN    Or    •  LORazepam (ATIVAN) injection 2 mg, 2 mg, Intravenous, Q1H PRN    •  Th

## 2020-11-12 NOTE — PLAN OF CARE
Patient is A&Ox4  Denies any pain or discomfort  No n/v/d  Afebrile, VSS  Acccucheck QID  IVF infusing  NSR-ST on tele; on room air  Up with sb assist  Possible dc today ?   Will continue to monitor          Problem: PAIN - ADULT  Goal: Verbalizes/displays resources  Description: INTERVENTIONS:  - Identify barriers to discharge w/pt and caregiver  - Include patient/family/discharge partner in discharge planning  - Arrange for needed discharge resources and transportation as appropriate  - Identify discharge (undernourished)  Description: INTERVENTIONS:  - Monitor percentage of each meal consumed  - Identify factors contributing to decreased intake, treat as appropriate  - Assist with meals as needed  - Monitor I&O, WT and lab values  - Obtain nutritional cons NEUROLOGICAL - ADULT  Goal: Absence of seizures  Description: INTERVENTIONS  - Monitor for seizure activity  - Administer anti-seizure medications as ordered  - Monitor neurological status  Outcome: Progressing     Problem: Diabetes/Glucose Control  Goal:

## 2020-11-12 NOTE — PROGRESS NOTES
ENDOCRINOLOGY PROGRESS NOTE    Typed by Glenn Driver MD on 11/12/2020      S:  Pt moved to stepdown floor. Pt to be discharged today.       O: /76 (BP Location: Left arm)   Pulse 87   Temp 98.4 °F (36.9 °C) (Oral)   Resp 18   Ht 68.11\" (SGPT)      16 - 61 U/L 36   ALKALINE PHOSPHATASE      45 - 117 U/L 79         Component      Latest Ref Rng & Units 11/12/2020   WBC      4.0 - 11.0 x10(3) uL 5.4   RBC      4.30 - 5.70 x10(6)uL 3.37 (L)   Hemoglobin      13.0 - 17.5 g/dL 10.6 (L)   Hemat

## 2020-11-12 NOTE — PROGRESS NOTES
Assumed care of patient at 15:30 from room 7529. Novolog given for Carb coverage for dinner. Patient resting in bed. IVF running. Staff will continue to monitor patient. RN to RN report to be given to [place].

## 2020-11-13 NOTE — DISCHARGE SUMMARY
[facility] [place] HOSPITALIST  DISCHARGE SUMMARY     [facility] Patient Status:  Inpatient    1980 MRN [id number]   [facility] 3NE-A Attending No att. providers found   Hosp Day # 2 PCP Kevin Villagran     Date of Admission: 11/10/2020 any of his insulin which he admits to not taking as he is supposed to. He complains of back pain which is chronic. He denies chest pain or difficulty breathing. He has mild epigastric pain. No fevers or chills. He denies any COVID-19 exposure.   He priyanka HCl 50 MG Tabs  Commonly known as: BENADRYL      Take one (1) 50mg tablet by mouth one (1) hour before the examination.    Quantity: 1 tablet  Refills: 0     Fiasp FlexTouch 100 UNIT/ML Sopn  Generic drug: Insulin Aspart (w/Niacinamide)      Take 8-10 Units Bernice Abdullahi  [address] [facility] [address]  [phone]    In 2 weeks  For diabetes    Rea Contreras MD  [address] [phone]    In 3 weeks  after CXR     ---------------------------------------

## 2020-11-16 NOTE — PAYOR COMM NOTE
--------------  DISCHARGE REVIEW    Payor: Manohar Irving #:  [id number]  Authorization Number: [id number]    Admit date: 11/10/20  Admit time:  0732  Discharge Date: 11/12/2020  5:05 PM     Admitting Physician: Werner Sales Risk  0-28   Low Risk.      Risk of readmission: Geronimo Eckert has Moderate Risk of readmission after discharge from the hospital.    History of Present Illness:  Geronimo Eckert is a 41year old male with past medical history of nicotine and al MG Tabs  Commonly known as: LEVAQUIN      Take 1 tablet (750 mg total) by mouth daily for 3 days.    Stop taking on: November 15, 2020  Quantity: 3 tablet  Refills: 0        CONTINUE taking these medications      Instructions Prescription details   ALPRAZol 8 (eight) hours as needed for Pain.    Quantity: 20 tablet  Refills: 0     Tresiba FlexTouch 100 UNIT/ML Sopn  Generic drug: Insulin Degludec      Take 24 Units with breakfast   Refills: 0     Vivitrol 380 MG Susr  Generic drug: Naltrexone      Take by mout

## 2020-11-17 NOTE — ED NOTES
Pt here requesting rapid covid testing for travel on Wednesday. Pt was informed that he would most likely receive an  test but it was at the doctors discretion. Pt needs to travel emergently for a family member who passed away.

## 2020-11-17 NOTE — ED INITIAL ASSESSMENT (HPI)
Patient here requesting a covid testing he is traveling on Wednesday and they are requiring a test. No symptoms

## 2020-11-17 NOTE — ED NOTES
Discharge paperwork provided to patient. Patient verbalizes understanding. Patient ambulatory with steady gait at this time.

## 2020-11-17 NOTE — ED NOTES
RN and MD at bedside. Plan of care discussed at length with patient, all questions answered at this time. Call light within reach. Will continue to monitor.

## 2020-11-17 NOTE — ED PROVIDER NOTES
Patient Seen in: [facility] Emergency Department In [place]      History   Patient presents with:  Testing    Stated Complaint: requests testing for covid    HPI    This is a 58-year-old male requesting a test for Covid.   The patient states he has to catc Impression:  Encounter for laboratory testing for COVID-19 virus  (primary encounter diagnosis)    Disposition:  Discharge  11/16/2020  8:19 pm    Follow-up:  Greg Tesfaye  [address] [place] [address]  [phone]      As needed

## 2020-11-20 ENCOUNTER — TELEPHONE (OUTPATIENT)
Dept: FAMILY MEDICINE | Age: 40
End: 2020-11-20

## 2020-12-07 ENCOUNTER — TELEPHONE (OUTPATIENT)
Dept: SCHEDULING | Age: 40
End: 2020-12-07

## 2020-12-07 ENCOUNTER — PATIENT MESSAGE (OUTPATIENT)
Dept: ADMINISTRATIVE | Facility: HOSPITAL | Age: 40
End: 2020-12-07

## 2020-12-08 ENCOUNTER — TELEPHONE (OUTPATIENT)
Dept: SCHEDULING | Age: 40
End: 2020-12-08

## 2020-12-09 ENCOUNTER — LAB SERVICES (OUTPATIENT)
Dept: LAB | Age: 40
End: 2020-12-09

## 2020-12-09 ENCOUNTER — OFFICE VISIT (OUTPATIENT)
Dept: INTERNAL MEDICINE | Age: 40
End: 2020-12-09

## 2020-12-09 VITALS
BODY MASS INDEX: 19.88 KG/M2 | DIASTOLIC BLOOD PRESSURE: 93 MMHG | HEART RATE: 98 BPM | SYSTOLIC BLOOD PRESSURE: 144 MMHG | HEIGHT: 68 IN | OXYGEN SATURATION: 99 % | TEMPERATURE: 97.3 F | WEIGHT: 131.17 LBS

## 2020-12-09 DIAGNOSIS — R80.9 MICROALBUMINURIA: ICD-10-CM

## 2020-12-09 DIAGNOSIS — Z72.0 TOBACCO USE: ICD-10-CM

## 2020-12-09 DIAGNOSIS — E78.2 ELEVATED CHOLESTEROL WITH HIGH TRIGLYCERIDES: ICD-10-CM

## 2020-12-09 DIAGNOSIS — F10.21 HISTORY OF ALCOHOLISM (CMD): ICD-10-CM

## 2020-12-09 DIAGNOSIS — Z09 HOSPITAL DISCHARGE FOLLOW-UP: Primary | ICD-10-CM

## 2020-12-09 DIAGNOSIS — E10.29 TYPE 1 DIABETES MELLITUS WITH MICROALBUMINURIA (CMD): ICD-10-CM

## 2020-12-09 DIAGNOSIS — R80.9 TYPE 1 DIABETES MELLITUS WITH MICROALBUMINURIA (CMD): ICD-10-CM

## 2020-12-09 DIAGNOSIS — Z00.00 PREVENTATIVE HEALTH CARE: ICD-10-CM

## 2020-12-09 DIAGNOSIS — R03.0 ELEVATED BLOOD PRESSURE READING: ICD-10-CM

## 2020-12-09 DIAGNOSIS — K85.90 ACUTE PANCREATITIS, UNSPECIFIED COMPLICATION STATUS, UNSPECIFIED PANCREATITIS TYPE: ICD-10-CM

## 2020-12-09 DIAGNOSIS — D64.9 MILD ANEMIA: ICD-10-CM

## 2020-12-09 PROCEDURE — 3077F SYST BP >= 140 MM HG: CPT | Performed by: INTERNAL MEDICINE

## 2020-12-09 PROCEDURE — 99213 OFFICE O/P EST LOW 20 MIN: CPT | Performed by: INTERNAL MEDICINE

## 2020-12-09 PROCEDURE — 36415 COLL VENOUS BLD VENIPUNCTURE: CPT | Performed by: INTERNAL MEDICINE

## 2020-12-09 PROCEDURE — 83690 ASSAY OF LIPASE: CPT | Performed by: INTERNAL MEDICINE

## 2020-12-09 PROCEDURE — 82607 VITAMIN B-12: CPT | Performed by: INTERNAL MEDICINE

## 2020-12-09 PROCEDURE — 3080F DIAST BP >= 90 MM HG: CPT | Performed by: INTERNAL MEDICINE

## 2020-12-09 PROCEDURE — 99396 PREV VISIT EST AGE 40-64: CPT | Performed by: INTERNAL MEDICINE

## 2020-12-09 PROCEDURE — 80061 LIPID PANEL: CPT | Performed by: INTERNAL MEDICINE

## 2020-12-09 PROCEDURE — 99406 BEHAV CHNG SMOKING 3-10 MIN: CPT | Performed by: INTERNAL MEDICINE

## 2020-12-09 PROCEDURE — 80053 COMPREHEN METABOLIC PANEL: CPT | Performed by: INTERNAL MEDICINE

## 2020-12-09 RX ORDER — INSULIN DEGLUDEC INJECTION 100 U/ML
INJECTION, SOLUTION SUBCUTANEOUS DAILY
COMMUNITY

## 2020-12-09 ASSESSMENT — PATIENT HEALTH QUESTIONNAIRE - PHQ9
2. FEELING DOWN, DEPRESSED OR HOPELESS: NOT AT ALL
SUM OF ALL RESPONSES TO PHQ9 QUESTIONS 1 AND 2: 0
CLINICAL INTERPRETATION OF PHQ2 SCORE: NO FURTHER SCREENING NEEDED
CLINICAL INTERPRETATION OF PHQ9 SCORE: NO FURTHER SCREENING NEEDED
1. LITTLE INTEREST OR PLEASURE IN DOING THINGS: NOT AT ALL
SUM OF ALL RESPONSES TO PHQ9 QUESTIONS 1 AND 2: 0

## 2020-12-10 LAB
ALBUMIN SERPL-MCNC: 4 G/DL (ref 3.6–5.1)
ALBUMIN/GLOB SERPL: 1.3 {RATIO} (ref 1–2.4)
ALP SERPL-CCNC: 85 UNITS/L (ref 45–117)
ALT SERPL-CCNC: 31 UNITS/L
ANION GAP SERPL CALC-SCNC: 12 MMOL/L (ref 10–20)
AST SERPL-CCNC: 27 UNITS/L
BILIRUB SERPL-MCNC: 0.6 MG/DL (ref 0.2–1)
BUN SERPL-MCNC: 6 MG/DL (ref 6–20)
BUN/CREAT SERPL: 9 (ref 7–25)
CALCIUM SERPL-MCNC: 9.9 MG/DL (ref 8.4–10.2)
CHLORIDE SERPL-SCNC: 105 MMOL/L (ref 98–107)
CHOLEST SERPL-MCNC: 231 MG/DL
CHOLEST/HDLC SERPL: 3.8 {RATIO}
CO2 SERPL-SCNC: 27 MMOL/L (ref 21–32)
CREAT SERPL-MCNC: 0.64 MG/DL (ref 0.67–1.17)
FASTING DURATION TIME PATIENT: 12 HOURS
FASTING DURATION TIME PATIENT: 12 HOURS
GFR SERPLBLD BASED ON 1.73 SQ M-ARVRAT: >90 ML/MIN/1.73M2
GLOBULIN SER-MCNC: 3 G/DL (ref 2–4)
GLUCOSE SERPL-MCNC: 293 MG/DL (ref 65–99)
HDLC SERPL-MCNC: 61 MG/DL
LDLC SERPL CALC-MCNC: 103 MG/DL
LIPASE SERPL-CCNC: 147 UNITS/L (ref 73–393)
NONHDLC SERPL-MCNC: 170 MG/DL
POTASSIUM SERPL-SCNC: 3.9 MMOL/L (ref 3.4–5.1)
PROT SERPL-MCNC: 7 G/DL (ref 6.4–8.2)
SODIUM SERPL-SCNC: 140 MMOL/L (ref 135–145)
TRIGL SERPL-MCNC: 336 MG/DL
VIT B12 SERPL-MCNC: 581 PG/ML (ref 211–911)

## 2020-12-10 RX ORDER — ROSUVASTATIN CALCIUM 10 MG/1
10 TABLET, COATED ORAL AT BEDTIME
Qty: 90 TABLET | Refills: 3 | Status: SHIPPED | OUTPATIENT
Start: 2020-12-10

## 2020-12-11 NOTE — ED PROVIDER NOTES
Caller: Hiram Ho    Relationship to patient: Self    Best call back number: [phone]       Patient is needing: Patient called in and is requesting a call back from Dr. Gabriel if possible . Please call back            Patient Seen in: [facility] Emergency Department In [place]    History   Patient presents with:  Abdomen/Flank Pain (GI/)    Stated Complaint: abd pain     HPI    39-year-old male presents to the emergency department complaining of right upper quadrant leandro. Vital signs were reviewed per nurse's notes. HEENT: Normocephalic atraumatic. Anicteric sclera. Oral mucosa is moist.  Oropharynx is normal.  Neck: No adenopathy or thyromegaly. Lungs are clear to auscultation.   Heart exam: Normal S1-S2 withou Course as of Jan 22 2031  ------------------------------------------------------------  Intravenous access was obtained and the patient was treated with IV fluids, analgesics and antiemetics.     Us Abdomen Complete (cpt=76700)    Result Date: 1/22/2018  CO record indicates that the patient has had elevated blood pressure in the past even though he may not have been treated for it. Intravenous hydralazine was given. The patient was upgraded to a med telemetry bed.   I do not believe that the patient is going Arthritis    Cataract  bilateral  DJD (degenerative joint disease)    HTN (hypertension)    Legally blind

## 2021-01-05 ENCOUNTER — TELEPHONE (OUTPATIENT)
Dept: SCHEDULING | Age: 41
End: 2021-01-05

## 2021-01-14 ENCOUNTER — OFFICE VISIT (OUTPATIENT)
Dept: INTERNAL MEDICINE | Age: 41
End: 2021-01-14

## 2021-01-14 ENCOUNTER — TELEPHONE (OUTPATIENT)
Dept: SCHEDULING | Age: 41
End: 2021-01-14

## 2021-01-14 ENCOUNTER — LAB SERVICES (OUTPATIENT)
Dept: LAB | Age: 41
End: 2021-01-14

## 2021-01-14 VITALS
BODY MASS INDEX: 20.31 KG/M2 | TEMPERATURE: 97.1 F | WEIGHT: 134.04 LBS | DIASTOLIC BLOOD PRESSURE: 100 MMHG | HEIGHT: 68 IN | HEART RATE: 104 BPM | OXYGEN SATURATION: 99 % | SYSTOLIC BLOOD PRESSURE: 165 MMHG

## 2021-01-14 DIAGNOSIS — E10.29 TYPE 1 DIABETES MELLITUS WITH MICROALBUMINURIA (CMD): ICD-10-CM

## 2021-01-14 DIAGNOSIS — R80.9 MICROALBUMINURIA: ICD-10-CM

## 2021-01-14 DIAGNOSIS — F10.20 UNCOMPLICATED ALCOHOL DEPENDENCE (CMD): ICD-10-CM

## 2021-01-14 DIAGNOSIS — D64.9 MILD ANEMIA: ICD-10-CM

## 2021-01-14 DIAGNOSIS — Z23 NEED FOR PNEUMOCOCCAL VACCINATION: ICD-10-CM

## 2021-01-14 DIAGNOSIS — R80.9 TYPE 1 DIABETES MELLITUS WITH MICROALBUMINURIA (CMD): ICD-10-CM

## 2021-01-14 DIAGNOSIS — E78.2 ELEVATED CHOLESTEROL WITH HIGH TRIGLYCERIDES: ICD-10-CM

## 2021-01-14 DIAGNOSIS — E78.2 ELEVATED CHOLESTEROL WITH HIGH TRIGLYCERIDES: Primary | ICD-10-CM

## 2021-01-14 DIAGNOSIS — Z11.59 NEED FOR HEPATITIS C SCREENING TEST: ICD-10-CM

## 2021-01-14 DIAGNOSIS — Z72.0 TOBACCO USE: ICD-10-CM

## 2021-01-14 LAB
CHOLEST SERPL-MCNC: 200 MG/DL
CHOLEST/HDLC SERPL: 2.4 {RATIO}
CREAT UR-MCNC: 59.9 MG/DL
DEPRECATED RDW RBC: 42.2 FL (ref 39–50)
ERYTHROCYTE [DISTWIDTH] IN BLOOD: 12.5 % (ref 11–15)
FASTING DURATION TIME PATIENT: ABNORMAL H
HCT VFR BLD CALC: 45.7 % (ref 39–51)
HCV AB SER QL: NEGATIVE
HDLC SERPL-MCNC: 84 MG/DL
HGB BLD-MCNC: 15.6 G/DL (ref 13–17)
LDLC SERPL CALC-MCNC: 85 MG/DL
MCH RBC QN AUTO: 31.3 PG (ref 26–34)
MCHC RBC AUTO-ENTMCNC: 34.1 G/DL (ref 32–36.5)
MCV RBC AUTO: 91.6 FL (ref 78–100)
MICROALBUMIN UR-MCNC: 3.23 MG/DL
MICROALBUMIN/CREAT UR: 53.9 MG/G
NONHDLC SERPL-MCNC: 116 MG/DL
NRBC BLD MANUAL-RTO: 0 /100 WBC
PLATELET # BLD AUTO: 146 K/MCL (ref 140–450)
RBC # BLD: 4.99 MIL/MCL (ref 4.5–5.9)
TRIGL SERPL-MCNC: 153 MG/DL
WBC # BLD: 4.7 K/MCL (ref 4.2–11)

## 2021-01-14 PROCEDURE — 90471 IMMUNIZATION ADMIN: CPT

## 2021-01-14 PROCEDURE — 99214 OFFICE O/P EST MOD 30 MIN: CPT | Performed by: INTERNAL MEDICINE

## 2021-01-14 PROCEDURE — 3080F DIAST BP >= 90 MM HG: CPT | Performed by: INTERNAL MEDICINE

## 2021-01-14 PROCEDURE — 82043 UR ALBUMIN QUANTITATIVE: CPT | Performed by: INTERNAL MEDICINE

## 2021-01-14 PROCEDURE — 80061 LIPID PANEL: CPT | Performed by: INTERNAL MEDICINE

## 2021-01-14 PROCEDURE — 82570 ASSAY OF URINE CREATININE: CPT | Performed by: INTERNAL MEDICINE

## 2021-01-14 PROCEDURE — 86803 HEPATITIS C AB TEST: CPT | Performed by: INTERNAL MEDICINE

## 2021-01-14 PROCEDURE — 3077F SYST BP >= 140 MM HG: CPT | Performed by: INTERNAL MEDICINE

## 2021-01-14 PROCEDURE — 90732 PPSV23 VACC 2 YRS+ SUBQ/IM: CPT

## 2021-01-14 PROCEDURE — 36415 COLL VENOUS BLD VENIPUNCTURE: CPT | Performed by: INTERNAL MEDICINE

## 2021-01-14 PROCEDURE — 85027 COMPLETE CBC AUTOMATED: CPT | Performed by: INTERNAL MEDICINE

## 2021-01-14 RX ORDER — NICOTINE 21 MG/24HR
1 PATCH, TRANSDERMAL 24 HOURS TRANSDERMAL EVERY 24 HOURS
Qty: 30 EACH | Refills: 6 | Status: SHIPPED | OUTPATIENT
Start: 2021-01-14

## 2021-01-14 RX ORDER — LOSARTAN POTASSIUM 50 MG/1
50 TABLET ORAL AT BEDTIME
Qty: 90 TABLET | Refills: 3 | Status: SHIPPED | OUTPATIENT
Start: 2021-01-14

## 2021-02-10 ENCOUNTER — TELEPHONE (OUTPATIENT)
Dept: SCHEDULING | Age: 41
End: 2021-02-10

## 2021-02-11 ENCOUNTER — APPOINTMENT (OUTPATIENT)
Dept: INTERNAL MEDICINE | Age: 41
End: 2021-02-11

## 2022-02-28 ENCOUNTER — TELEPHONE (OUTPATIENT)
Dept: SCHEDULING | Age: 42
End: 2022-02-28

## 2022-06-13 NOTE — BH LEVEL OF CARE ASSESSMENT
-- DO NOT REPLY / DO NOT REPLY ALL --  -- Message is from the [facility]--    General Patient Message      Reason for Call: Please call patient and patient wife back with scheduling a nurse to come to the patient home instead of a phone call please    Caller Information       Type Contact Phone    06/12/2022 09:12 PM CDT Phone (Incoming) ROSAURA CHAVIRA (Emergency Contact) [phone] (M)          Alternative phone number: na        Did the caller agree that this message can wait until the office reopens on Monday (or after the holiday)? YES - The Message Can Wait      Send a message to the provider's clinical support pool.     Turnaround time given to caller:   \"This message will be sent to [Juliette Armstrong]. The clinical team will fulfill your request as soon as they review your message when the office opens on Monday (or after the holiday).\"       Level of Care Assessment Note    General Questions  Precipitating Events: Patient was placed on the ciwa protocol for his etoh history. History of Present Illness: 40 y/o male admits to drinking up to 8 beers per day.   When asked how often he stated he dr Alcohol Use  How often do you have a drink containing alcohol? : 4 or more times per week  Alcohol Use  Age at first use?: 13  Route: Oral  A Hours?: No  Refused Treatment Reason: Wants Lower LOC

## 2022-12-11 ENCOUNTER — HOSPITAL ENCOUNTER (EMERGENCY)
Age: 42
Discharge: HOME | End: 2022-12-11
Payer: MEDICAID

## 2022-12-11 VITALS
DIASTOLIC BLOOD PRESSURE: 94 MMHG | RESPIRATION RATE: 18 BRPM | WEIGHT: 130.06 LBS | TEMPERATURE: 97 F | HEART RATE: 99 BPM | HEIGHT: 68 IN | SYSTOLIC BLOOD PRESSURE: 140 MMHG | OXYGEN SATURATION: 99 % | BODY MASS INDEX: 19.71 KG/M2

## 2022-12-11 DIAGNOSIS — Z76.0 ENCOUNTER FOR MEDICATION REFILL: Primary | ICD-10-CM

## 2022-12-11 PROCEDURE — 99283 EMERGENCY DEPT VISIT LOW MDM: CPT

## 2022-12-11 RX ORDER — GABAPENTIN 100 MG/1
100 CAPSULE ORAL 3 TIMES DAILY
COMMUNITY

## 2022-12-11 RX ORDER — GABAPENTIN 100 MG/1
300 CAPSULE ORAL 3 TIMES DAILY
Qty: 50 CAPSULE | Refills: 0 | Status: SHIPPED | OUTPATIENT
Start: 2022-12-11

## 2022-12-11 NOTE — DISCHARGE INSTRUCTIONS
Check blood sugar 3 times a day as instructed. Take gabapentin as needed for neuropathy. May also take Tylenol. Follow-up with your primary care physician in 10 days as scheduled.

## 2022-12-11 NOTE — ED INITIAL ASSESSMENT (HPI)
\" I am experiencing some diabetic neuropathy, I need prescription for glucometer,lancets and test strips\". Pt states he needs one that is covered by his insurance.     Pt is establishing care with new pcp on 12/21/22

## 2023-01-03 ENCOUNTER — OFFICE VISIT (OUTPATIENT)
Dept: FAMILY MEDICINE CLINIC | Facility: CLINIC | Age: 43
End: 2023-01-03

## 2023-01-03 DIAGNOSIS — Z02.9 ADMINISTRATIVE ENCOUNTER: Primary | ICD-10-CM

## 2023-01-03 NOTE — PROGRESS NOTES
The patient reports right leg pain and bowel incontinence. Patient advised evaluation at [facility].

## 2023-03-13 NOTE — TELEPHONE ENCOUNTER
Patient has appointment scheduled with Dr Mercedes Jane tomorrow, is asking for hospital admission related to lower leg pain. Informed patient that he has not established care with this provider, so he would need to to walk-in care or ED if needed. Informed that we will see him tomorrow in office if he is available and not inpatient. Verbalized understanding.

## (undated) NOTE — ED AVS SNAPSHOT
Jaydon Calesharon   MRN: [id number]    Department:  [facility] Emergency Department   Date of Visit:  9/10/2017           Disclosure     Insurance plans vary and the physician(s) referred by the ER may not be covered by your plan.  Please contac If you have been prescribed any medication(s), please fill your prescription right away and begin taking the medication(s) as directed    If the emergency physician has read X-rays, these will be re-interpreted by a radiologist.  If there is a significant

## (undated) NOTE — LETTER
18    Patient: Cranford Sicard  : 1980 Visit date: 2018    Dear  Dr. Michelle Galan,    Thank you for referring Cranford Sicard to my practice. Please find my assessment and plan below.                 Assessment   Pancreatic necrosis Despite the fact that there is evidence of ischemia to that region, there is no air in the tissues, no air-fluid level, no obvious cyst wall development, and no liquefaction that is obvious in that site.   Also in the head of the pancreas he has a greater t He should report to me urgently for any fever, increasing abdominal pain, nausea and vomiting, continued profound weight loss, or any signs and symptoms that could indicate progression of his pancreatitis.     So far the patient states he has had no issues

## (undated) NOTE — LETTER
1/28/2018               Patient Name: Kendy Myers        To Whom It May Concern:     This letter has been written at the patient's request. The above patient was seen at [facility] for treatment of a medical condition from 1/22/2018-1/2

## (undated) NOTE — ED AVS SNAPSHOT
Addy Miguel   MRN: [id number]    Department:  [facility] Emergency Department   Date of Visit:  11/30/2017           Disclosure     Insurance plans vary and the physician(s) referred by the ER may not be covered by your plan.  Please conta tell this physician (or your personal doctor if your instructions are to return to your personal doctor) about any new or lasting problems. The primary care or specialist physician will see patients referred from the [facility] Emergency Department.  Daniel Rodas

## (undated) NOTE — LETTER
10/10/18    Cherelle Irving      To Whom It May Concern: This letter has been written at the patient's request. The above patient is being seen at [facility] for treatment of a medical condition.  The patient was admitted 10/9/2018 and discharg

## (undated) NOTE — IP AVS SNAPSHOT
[facility] [place] [address] [place], [address] ~ [phone]                Discharge Summary   6/16/2017    Kitty Santos [facility]           Admission Information        Provider Department    6/16/2017 Geoff Mendoza MD  [facility]ez                                Where to Get Your Medications      These medications were sent to [facility]Y #[id number]- [address], [address] [facility] [phone], [address] [place] [address]     Phone:  [phone] Patient Belongings       Most Recent Value    All belongings returned to patient at discharge Pt's bedside belongings    Medications Sent Home None to return    Medications Returned:           Additional Information       We are concerned for your overall w review your medications with you before you are discharged, and can provide you with additional printed information. Not all patients will experience these side effects or respond to medications the same.  Please call your provider or healthcare team if you